# Patient Record
Sex: MALE | Race: WHITE | NOT HISPANIC OR LATINO | Employment: FULL TIME | URBAN - METROPOLITAN AREA
[De-identification: names, ages, dates, MRNs, and addresses within clinical notes are randomized per-mention and may not be internally consistent; named-entity substitution may affect disease eponyms.]

---

## 2017-02-07 ENCOUNTER — OFFICE VISIT (OUTPATIENT)
Dept: FAMILY MEDICINE | Facility: CLINIC | Age: 47
End: 2017-02-07
Payer: COMMERCIAL

## 2017-02-07 VITALS
BODY MASS INDEX: 28.77 KG/M2 | HEART RATE: 76 BPM | TEMPERATURE: 97 F | SYSTOLIC BLOOD PRESSURE: 133 MMHG | OXYGEN SATURATION: 96 % | DIASTOLIC BLOOD PRESSURE: 80 MMHG | WEIGHT: 218 LBS

## 2017-02-07 DIAGNOSIS — B35.3 TINEA PEDIS OF BOTH FEET: ICD-10-CM

## 2017-02-07 DIAGNOSIS — R55 VASOVAGAL SYNCOPE: Primary | ICD-10-CM

## 2017-02-07 PROCEDURE — 99214 OFFICE O/P EST MOD 30 MIN: CPT | Performed by: FAMILY MEDICINE

## 2017-02-07 NOTE — NURSING NOTE
"Chief Complaint   Patient presents with     Syncope     2 fainting spells, one in december and the second was this morning. Cold sweats, dizziness and vision changes before he fainted.     Derm Problem     rash on feet ongoing for a while. Has tried antifungal cream        Initial /80 mmHg  Pulse 76  Temp(Src) 97  F (36.1  C) (Tympanic)  Wt 218 lb (98.884 kg)  SpO2 96% Estimated body mass index is 28.77 kg/(m^2) as calculated from the following:    Height as of 8/1/12: 6' 1\" (1.854 m).    Weight as of this encounter: 218 lb (98.884 kg).  Medication Reconciliation: complete   Liyah Rodriguez CMA    "

## 2017-02-07 NOTE — PROGRESS NOTES
SUBJECTIVE:                                                    Chris Jones is a 47 year old male who presents to clinic today for the following health issues:    Chief Complaint   Patient presents with     Syncope     2 fainting spells, one in december and the second was this morning. Cold sweats, dizziness and vision changes before he fainted.     Derm Problem     rash on feet ongoing for a while. Has tried antifungal cream      Chris Jones is a 47 year old male in generally good health who reports two episodes of syncope or near syncope.  He is accompanied by his wife.  The first episode occurred in December in the evening after basketball practice (he is a ). He was just talking to his wife, then felt ill, she states he looked pale, and he stopped talking and leaned against the wall until the feeling passed within a minute. She brought him some water to drink, and states she does not think he drinks adequately. The incident happened around 7 PM and he had not eaten since 11 AM.  The second episode occurred just this morning. He was sitting at his desk in the room, no students present, when he broke into a cold sweat, felt lightheaded, noted his vision receding, got up and walked to another chair to sit down but then felt out of the chair onto the floor. He was alone, does not think he lost consciousness for more than a minute, got up uninjured.    He had not had any breakfast this morning, normally skips breakfast, packs dinner leftovers to take to work for lunch around 11, may not eat again until 5-8 PM or sometimes later, denies afternoon snacks.  He will have 1-2 cups of coffee over the course of the morning and did have coffee today.  He agrees he doesn't drink much fluid, but does go to the bathroom 2-3 times daily.    He is a nonsmoker, denies chest pain or palpitation, denies breathing issues, has no problems keeping up in the basketball practices.  He has had mild head cold symptoms but no  fever or sore throat, denies GI or  issues.    OBJECTIVE: /80 mmHg  Pulse 76  Temp(Src) 97  F (36.1  C) (Tympanic)  Wt 218 lb (98.884 kg)  SpO2 96%    This is a well nourished, well developed gentleman, neatly and professionally dressed, in no apparent distress.  PERRL, conjunctiva clear.  Ears are clear; TMs show no fluid or erythema.  Nose is unremarkable.  Throat is clear.  Neck is without adenopathy.  The lungs are clear without wheezes, rales, or rhonchi.  Heart sounds are regular without murmur.  The abdomen is benign.  Skin is remarkable for a dry scaly rash on the instep and great toe, consistent with tinea pedis, currently being treated with terbinafine.     ASSESSMENT: vasovagal syncope       Tinea pedis      PLAN: We discussed adding a small amount of solid food to his breakfast, keeping snacks on hand for mid-afternoon, particularly if he is scheduled for after-school practice and will not be eating supper until late.   Increase fluid intake.  If feeling presyncopal, sit or lie down immediately and have someone bring a beverage.  At this time, no further work-up appears indicated.   Foot fungus discussed. His socks do get sweaty; recommend socks change during day.    Abena Esquivel md

## 2017-02-07 NOTE — MR AVS SNAPSHOT
After Visit Summary   2/7/2017    Chris Jones    MRN: [id number]           Patient Information     Date Of Birth          1970        Visit Information        Provider Department      2/7/2017 9:40 AM Abena Esquivel MD [facility]        Today's Diagnoses     Vasovagal syncope    -  1     Tinea pedis of both feet            Follow-ups after your visit        Who to contact     If you have questions or need follow up information about today's clinic visit or your schedule please contact [facility] directly at [phone].  Normal or non-critical lab and imaging results will be communicated to you by Oktalogichart, letter or phone within 4 business days after the clinic has received the results. If you do not hear from us within 7 days, please contact the clinic through Oktalogichart or phone. If you have a critical or abnormal lab result, we will notify you by phone as soon as possible.  Submit refill requests through [employer]PUBL[employer] or call your pharmacy and they will forward the refill request to us. Please allow 3 business days for your refill to be completed.          Additional Information About Your Visit        MyChart Information     [employer] gives you secure access to your electronic health record. If you see a primary care provider, you can also send messages to your care team and make appointments. If you have questions, please call your primary care clinic.  If you do not have a primary care provider, please call [phone] and they will assist you.        Care EveryWhere ID     This is your Care EveryWhere ID. This could be used by other organizations to access your [place] medical records  [id number]        Your Vitals Were     Pulse Temperature Pulse Oximetry             76 97  F (36.1  C) (Tympanic) 96%          Blood Pressure from Last 3 Encounters:   02/07/17 133/80   12/23/15 136/82   08/14/12 131/85    Weight from Last 3 Encounters:   02/07/17 218  lb (98.884 kg)   12/23/15 214 lb (97.07 kg)   08/14/12 218 lb 8 oz (99.111 kg)              Today, you had the following     No orders found for display       Primary Care Provider Office Phone # Fax #    R Kwabena Galvan [phone]       [facility] [id number] [place] [id number]        Thank you!     Thank you for choosing [facility]  for your care. Our goal is always to provide you with excellent care. Hearing back from our patients is one way we can continue to improve our services. Please take a few minutes to complete the written survey that you may receive in the mail after your visit with us. Thank you!             Your Updated Medication List - Protect others around you: Learn how to safely use, store and throw away your medicines at www.disposemymeds.org.          This list is accurate as of: 2/7/17 10:53 AM.  Always use your most recent med list.                   Brand Name Dispense Instructions for use    NO ACTIVE MEDICATIONS      .       terbinafine 1 % cream    lamISIL AT    12 g    Apply topically 2 times daily For fungal infection not resolved with other antifungals (e.g. Clotrimazole)

## 2017-12-05 ENCOUNTER — OFFICE VISIT (OUTPATIENT)
Dept: FAMILY MEDICINE | Facility: CLINIC | Age: 47
End: 2017-12-05
Payer: COMMERCIAL

## 2017-12-05 VITALS
SYSTOLIC BLOOD PRESSURE: 106 MMHG | BODY MASS INDEX: 28.63 KG/M2 | HEART RATE: 72 BPM | HEIGHT: 73 IN | WEIGHT: 216 LBS | DIASTOLIC BLOOD PRESSURE: 72 MMHG

## 2017-12-05 DIAGNOSIS — R55 VASOVAGAL SYNCOPE: Primary | ICD-10-CM

## 2017-12-05 DIAGNOSIS — G47.30 SLEEP APNEA, UNSPECIFIED TYPE: ICD-10-CM

## 2017-12-05 LAB
ALBUMIN SERPL-MCNC: 4 G/DL (ref 3.4–5)
ALP SERPL-CCNC: 54 U/L (ref 40–150)
ALT SERPL W P-5'-P-CCNC: 32 U/L (ref 0–70)
ANION GAP SERPL CALCULATED.3IONS-SCNC: 7 MMOL/L (ref 3–14)
AST SERPL W P-5'-P-CCNC: 13 U/L (ref 0–45)
BASOPHILS # BLD AUTO: 0 10E9/L (ref 0–0.2)
BASOPHILS NFR BLD AUTO: 0.4 %
BILIRUB SERPL-MCNC: 0.8 MG/DL (ref 0.2–1.3)
BUN SERPL-MCNC: 15 MG/DL (ref 7–30)
CALCIUM SERPL-MCNC: 8.4 MG/DL (ref 8.5–10.1)
CHLORIDE SERPL-SCNC: 106 MMOL/L (ref 94–109)
CO2 SERPL-SCNC: 28 MMOL/L (ref 20–32)
CREAT SERPL-MCNC: 0.95 MG/DL (ref 0.66–1.25)
DIFFERENTIAL METHOD BLD: NORMAL
EOSINOPHIL # BLD AUTO: 0.3 10E9/L (ref 0–0.7)
EOSINOPHIL NFR BLD AUTO: 4.5 %
ERYTHROCYTE [DISTWIDTH] IN BLOOD BY AUTOMATED COUNT: 11.8 % (ref 10–15)
GFR SERPL CREATININE-BSD FRML MDRD: 85 ML/MIN/1.7M2
GLUCOSE SERPL-MCNC: 89 MG/DL (ref 70–99)
HCT VFR BLD AUTO: 44.8 % (ref 40–53)
HGB BLD-MCNC: 15.5 G/DL (ref 13.3–17.7)
LYMPHOCYTES # BLD AUTO: 2.1 10E9/L (ref 0.8–5.3)
LYMPHOCYTES NFR BLD AUTO: 28.9 %
MCH RBC QN AUTO: 30.5 PG (ref 26.5–33)
MCHC RBC AUTO-ENTMCNC: 34.6 G/DL (ref 31.5–36.5)
MCV RBC AUTO: 88 FL (ref 78–100)
MONOCYTES # BLD AUTO: 0.5 10E9/L (ref 0–1.3)
MONOCYTES NFR BLD AUTO: 7.4 %
NEUTROPHILS # BLD AUTO: 4.2 10E9/L (ref 1.6–8.3)
NEUTROPHILS NFR BLD AUTO: 58.8 %
PLATELET # BLD AUTO: 257 10E9/L (ref 150–450)
POTASSIUM SERPL-SCNC: 3.9 MMOL/L (ref 3.4–5.3)
PROT SERPL-MCNC: 7.6 G/DL (ref 6.8–8.8)
RBC # BLD AUTO: 5.09 10E12/L (ref 4.4–5.9)
SODIUM SERPL-SCNC: 141 MMOL/L (ref 133–144)
TSH SERPL DL<=0.005 MIU/L-ACNC: 0.67 MU/L (ref 0.4–4)
WBC # BLD AUTO: 7.2 10E9/L (ref 4–11)

## 2017-12-05 PROCEDURE — 36415 COLL VENOUS BLD VENIPUNCTURE: CPT | Performed by: FAMILY MEDICINE

## 2017-12-05 PROCEDURE — 99214 OFFICE O/P EST MOD 30 MIN: CPT | Performed by: FAMILY MEDICINE

## 2017-12-05 PROCEDURE — 80050 GENERAL HEALTH PANEL: CPT | Performed by: FAMILY MEDICINE

## 2017-12-05 NOTE — NURSING NOTE
"Chief Complaint   Patient presents with     Patient Request     pt. is here today with concerns with another episode of blacking out 12/1/2017. pt. was seen in clinic on 2/7/2017 and saw Dr. Esquivel for same. pt. is requesting lab work. pt. is not fasting today.        Initial /72 (BP Location: Right arm, Patient Position: Chair, Cuff Size: Adult Large)  Pulse 72  Ht 6' 1\" (1.854 m)  Wt 216 lb (98 kg)  BMI 28.5 kg/m2 Estimated body mass index is 28.5 kg/(m^2) as calculated from the following:    Height as of this encounter: 6' 1\" (1.854 m).    Weight as of this encounter: 216 lb (98 kg).  Medication Reconciliation: complete     Estrella Person, CMA      "

## 2017-12-05 NOTE — PROGRESS NOTES
Subjective:  Chris Jones is a 47 year old male   Chief Complaint   Patient presents with     Patient Request     pt. is here today with concerns with another episode of blacking out 12/1/2017. pt. was seen in clinic on 2/7/2017 and saw Dr. Esquivel for same. pt. is requesting lab work. pt. is not fasting today.      He has now had 3 episodes of syncope in the last year.  The first 2 occurred when he had not eaten any food for a number of hours, so he was advised to eat a moderate breakfast every morning and to be sure to maintain adequate hydration.  However the last episode occurred and he had been eating and taking adequate fluids.  None of the episodes were associated with being frightened or seen an unpleasant site, or standing at attention.  He will get some morning and that he feels sweaty and weak and nauseated but has never had palpitations or feeling of tachycardia.  They do not seem to be associated with stress.      He has a strong family history of sleep apnea and his wife has observed apneic episodes while sleeping.  He is given a referral for a sleep evaluation a couple years ago but never got around to arranging it.  Is willing to go ahead at this time      Encounter Diagnoses   Name Primary?     Vasovagal syncope Yes     Sleep apnea, unspecified type        ROS:other than noted above, general, HEENT, respiratory, cardiac, gastrointestinal systems are negative    Medical, surgical, social, and family histories, medications and allergies reviewed and updated.    Objective:  Exam:    GENERAL APPEARANCE ADULT: Alert, no acute distress  EYES: PERRL, EOM normal, conjunctiva and lids normal  HENT: Ears and TMs normal, oral mucosa and posterior oropharynx normal  NECK: No adenopathy,masses or thyromegaly  RESP: lungs clear to auscultation   CV: normal rate, regular rhythm, no murmur or gallop  NEURO: Alert, oriented, speech and mentation normal  PSYCH: mentation appears normal., affect and mood  normal      ASSESSMENT:  1. Vasovagal syncope    2. Sleep apnea, unspecified type      These are probably vasovagal syncopal episodes, but he does not have any of the usual triggers associated with this type of syncope.  Therefore some additional workup is warranted    PLAN:      At this point he has not had any laboratory evaluation so will obtain that today.  We will also set him up for a Holter monitor to check for any irregular rhythms or signs of bradycardia  Orders Placed This Encounter     CBC with platelets differential     Comprehensive metabolic panel     TSH with free T4 reflex     SLEEP EVALUATION & MANAGEMENT REFERRAL - [facility]  [phone] (Age 2 and up)       I will contact him when all the results are back

## 2017-12-05 NOTE — MR AVS SNAPSHOT
After Visit Summary   12/5/2017    Chris Jones    MRN: [id number]           Patient Information     Date Of Birth          1970        Visit Information        Provider Department      12/5/2017 11:20 AM ESTHER Galvan MD [facility]        Today's Diagnoses     Vasovagal syncope    -  1    Sleep apnea, unspecified type           Follow-ups after your visit        Additional Services     SLEEP EVALUATION & MANAGEMENT REFERRAL - [facility]  [phone] (Age 2 and up)       Please be aware that coverage of these services is subject to the terms and limitations of your health insurance plan.  Call member services at your health plan with any benefit or coverage questions.      Please bring the following to your appointment:    >>   List of current medications   >>   This referral request   >>   Any documents/labs given to you for this referral                      Future tests that were ordered for you today     Open Future Orders        Priority Expected Expires Ordered    SLEEP EVALUATION & MANAGEMENT REFERRAL - [facility]  [phone] (Age 2 and up) Routine  12/5/2018 12/5/2017    Zio Patch Holter Routine  1/19/2018 12/5/2017            Who to contact     If you have questions or need follow up information about today's clinic visit or your schedule please contact [facility] directly at [phone].  Normal or non-critical lab and imaging results will be communicated to you by MyChart, letter or phone within 4 business days after the clinic has received the results. If you do not hear from us within 7 days, please contact the clinic through MyChart or phone. If you have a critical or abnormal lab result, we will notify you by phone as soon as possible.  Submit refill requests through [employer]Worx or call your pharmacy and they will forward the refill request to us. Please allow 3 business days for your  "refill to be completed.          Additional Information About Your Visit        MyChart Information     Workday gives you secure access to your electronic health record. If you see a primary care provider, you can also send messages to your care team and make appointments. If you have questions, please call your primary care clinic.  If you do not have a primary care provider, please call [phone] and they will assist you.        Care EveryWhere ID     This is your Care EveryWhere ID. This could be used by other organizations to access your [place] medical records  [id number]        Your Vitals Were     Pulse Height BMI (Body Mass Index)             72 6' 1\" (1.854 m) 28.5 kg/m2          Blood Pressure from Last 3 Encounters:   12/05/17 106/72   02/07/17 133/80   12/23/15 136/82    Weight from Last 3 Encounters:   12/05/17 216 lb (98 kg)   02/07/17 218 lb (98.9 kg)   12/23/15 214 lb (97.1 kg)              We Performed the Following     CBC with platelets differential     Comprehensive metabolic panel     TSH with free T4 reflex        Primary Care Provider Office Phone # Fax #    R Kwabena Galvan [phone] [address] [facility] [address]        Equal Access to Services     HERMES FUNEZ : Hadii ismael thompson hadasho Soomaali, waaxda luqadaha, qaybta kaalmada adeegyada, tanika hoffmann terrence roe. So [facility] [phone].    ATENCIÓN: Si habla español, tiene a gonzales disposición servicios gratuitos de asistencia lingüística. Llame al [phone].    We comply with applicable federal civil rights laws and [place] laws. We do not discriminate on the basis of race, color, national origin, age, disability, sex, sexual orientation, or gender identity.            Thank you!     Thank you for choosing [facility]  for your care. Our goal is always to provide you with excellent care. Hearing back from our patients is one way we can continue to improve our services. " Please take a few minutes to complete the written survey that you may receive in the mail after your visit with us. Thank you!             Your Updated Medication List - Protect others around you: Learn how to safely use, store and throw away your medicines at www.disposemymeds.org.          This list is accurate as of: 12/5/17 12:07 PM.  Always use your most recent med list.                   Brand Name Dispense Instructions for use Diagnosis    NO ACTIVE MEDICATIONS      .        terbinafine 1 % cream    lamISIL AT    12 g    Apply topically 2 times daily For fungal infection not resolved with other antifungals (e.g. Clotrimazole)    Tinea pedis of both feet

## 2017-12-07 NOTE — PROGRESS NOTES
Chris,  Your lab studies are all in the acceptable range. We will let you know about the heart monitor results when they become available.      Kwabena Galvan MD

## 2017-12-11 ENCOUNTER — HOSPITAL ENCOUNTER (OUTPATIENT)
Dept: CARDIOLOGY | Facility: CLINIC | Age: 47
Discharge: HOME | End: 2017-12-11
Attending: FAMILY MEDICINE | Admitting: FAMILY MEDICINE
Payer: COMMERCIAL

## 2017-12-11 DIAGNOSIS — R55 VASOVAGAL SYNCOPE: ICD-10-CM

## 2017-12-11 PROCEDURE — 0296T ZIO PATCH HOLTER: CPT

## 2017-12-11 PROCEDURE — 0298T ZZC EXT ECG > 48HR TO 21 DAY REVIEW AND INTERPRETATN: CPT | Performed by: INTERNAL MEDICINE

## 2017-12-19 ENCOUNTER — OFFICE VISIT (OUTPATIENT)
Dept: SLEEP MEDICINE | Facility: CLINIC | Age: 47
End: 2017-12-19
Payer: COMMERCIAL

## 2017-12-19 VITALS
SYSTOLIC BLOOD PRESSURE: 125 MMHG | HEIGHT: 73 IN | DIASTOLIC BLOOD PRESSURE: 79 MMHG | OXYGEN SATURATION: 97 % | HEART RATE: 71 BPM | BODY MASS INDEX: 28.63 KG/M2 | WEIGHT: 216 LBS

## 2017-12-19 DIAGNOSIS — G47.30 SLEEP APNEA, UNSPECIFIED TYPE: ICD-10-CM

## 2017-12-19 DIAGNOSIS — R53.81 MALAISE AND FATIGUE: ICD-10-CM

## 2017-12-19 DIAGNOSIS — R53.83 MALAISE AND FATIGUE: ICD-10-CM

## 2017-12-19 DIAGNOSIS — R06.89 DYSPNEA AND RESPIRATORY ABNORMALITY: Primary | ICD-10-CM

## 2017-12-19 DIAGNOSIS — R06.00 DYSPNEA AND RESPIRATORY ABNORMALITY: Primary | ICD-10-CM

## 2017-12-19 DIAGNOSIS — Z72.820 LACK OF ADEQUATE SLEEP: ICD-10-CM

## 2017-12-19 PROCEDURE — 99244 OFF/OP CNSLTJ NEW/EST MOD 40: CPT | Performed by: PHYSICIAN ASSISTANT

## 2017-12-19 NOTE — PATIENT INSTRUCTIONS

## 2017-12-19 NOTE — MR AVS SNAPSHOT
After Visit Summary   12/19/2017    Chris Jones    MRN: [id number]           Patient Information     Date Of Birth          1970        Visit Information        Provider Department      12/19/2017 1:30 PM Eitan Anaya PA [facility]        Today's Diagnoses     Dyspnea and respiratory abnormality    -  1    Sleep apnea, unspecified type        Lack of adequate sleep        Malaise and fatigue          Care Instructions      Your BMI is Body mass index is 28.5 kg/(m^2).  Weight management is a personal decision.  If you are interested in exploring weight loss strategies, the following discussion covers the approaches that may be successful. Body mass index (BMI) is one way to tell whether you are at a healthy weight, overweight, or obese. It measures your weight in relation to your height.  A BMI of 18.5 to 24.9 is in the healthy range. A person with a BMI of 25 to 29.9 is considered overweight, and someone with a BMI of 30 or greater is considered obese. More than two-thirds of American adults are considered overweight or obese.  Being overweight or obese increases the risk for further weight gain. Excess weight may lead to heart disease and diabetes.  Creating and following plans for healthy eating and physical activity may help you improve your health.  Weight control is part of healthy lifestyle and includes exercise, emotional health, and healthy eating habits. Careful eating habits lifelong are the mainstay of weight control. Though there are significant health benefits from weight loss, long-term weight loss with diet alone may be very difficult to achieve- studies show long-term success with dietary management in less than 10% of people. Attaining a healthy weight may be especially difficult to achieve in those with severe obesity. In some cases, medications, devices and surgical management might be considered.  What can you do?  If you are overweight or obese and  are interested in methods for weight loss, you should discuss this with your provider.     Consider reducing daily calorie intake by 500 calories.     Keep a food journal.     Avoiding skipping meals, consider cutting portions instead.    Diet combined with exercise helps maintain muscle while optimizing fat loss. Strength training is particularly important for building and maintaining muscle mass. Exercise helps reduce stress, increase energy, and improves fitness. Increasing exercise without diet control, however, may not burn enough calories to loose weight.       Start walking three days a week 10-20 minutes at a time    Work towards walking thirty minutes five days a week     Eventually, increase the speed of your walking for 1-2 minutes at time    In addition, we recommend that you review healthy lifestyles and methods for weight loss available through the National Institutes of Health patient information sites:  http://win.niddk.nih.gov/publications/index.htm    And look into health and wellness programs that may be available through your health insurance provider, employer, local community center, or lance club.    Weight management plan: Patient was referred to their PCP to discuss a diet and exercise plan.            Follow-ups after your visit        Your next 10 appointments already scheduled     Dec 26, 2017 10:30 AM CST   HST  with SLEEP LAB, BED FIVE   [facility]    [address]  [facility] [address]   [phone]            Dec 27, 2017  1:00 PM CST   HST Drop Off with SLEEP LAB, BED FIVE   [facility])    [address]  [facility] [address]   [phone]            Jan 02, 2018  2:30 PM CST   Return Sleep Patient with ANUJA Dumont   [facility])    [address]  [facility] [address]   [phone]  "             Future tests that were ordered for you today     Open Future Orders        Priority Expected Expires Ordered    HST-Home Sleep Apnea Test Routine  6/20/2018 12/19/2017            Who to contact     If you have questions or need follow up information about today's clinic visit or your schedule please contact [facility] directly at [phone].  Normal or non-critical lab and imaging results will be communicated to you by MyChart, letter or phone within 4 business days after the clinic has received the results. If you do not hear from us within 7 days, please contact the clinic through PTC Therapeuticshart or phone. If you have a critical or abnormal lab result, we will notify you by phone as soon as possible.  Submit refill requests through [employer] or call your pharmacy and they will forward the refill request to us. Please allow 3 business days for your refill to be completed.          Additional Information About Your Visit        PTC Therapeuticshart Information     [employer] gives you secure access to your electronic health record. If you see a primary care provider, you can also send messages to your care team and make appointments. If you have questions, please call your primary care clinic.  If you do not have a primary care provider, please call [phone] and they will assist you.        Care EveryWhere ID     This is your Care EveryWhere ID. This could be used by other organizations to access your [place] medical records  [id number]        Your Vitals Were     Pulse Height Pulse Oximetry BMI (Body Mass Index)          71 1.854 m (6' 1\") 97% 28.5 kg/m2         Blood Pressure from Last 3 Encounters:   12/19/17 125/79   12/05/17 106/72   02/07/17 133/80    Weight from Last 3 Encounters:   12/19/17 98 kg (216 lb)   12/05/17 98 kg (216 lb)   02/07/17 98.9 kg (218 lb)              We Performed the Following     SLEEP EVALUATION & MANAGEMENT REFERRAL - ADULT -[facility]  " [phone] (Age 2 and up)        Primary Care Provider Office Phone # Fax #    R Kwabena Galvan [phone] [address] [facility] [address]        Equal Access to Services     HERMES FUNEZ : Hadii ismael ku hadngozio Soomaali, waaxda luqadaha, qaybta kaalmada adeegyada, tanika hoffmann terrence powers laAntoniobettie roe. So [facility] [phone].    ATENCIÓN: Si habla español, tiene a gonzales disposición servicios gratuitos de asistencia lingüística. Llame al [phone].    We comply with applicable federal civil rights laws and [place] laws. We do not discriminate on the basis of race, color, national origin, age, disability, sex, sexual orientation, or gender identity.            Thank you!     Thank you for choosing [facility]  for your care. Our goal is always to provide you with excellent care. Hearing back from our patients is one way we can continue to improve our services. Please take a few minutes to complete the written survey that you may receive in the mail after your visit with us. Thank you!             Your Updated Medication List - Protect others around you: Learn how to safely use, store and throw away your medicines at www.disposemymeds.org.          This list is accurate as of: 12/19/17  2:18 PM.  Always use your most recent med list.                   Brand Name Dispense Instructions for use Diagnosis    NO ACTIVE MEDICATIONS      .

## 2017-12-19 NOTE — NURSING NOTE
"Chief Complaint   Patient presents with     Consult     Dr. Mandy barboza, witnessed apnea, daytime fatigue       Initial /79  Pulse 71  Ht 1.854 m (6' 1\")  Wt 98 kg (216 lb)  SpO2 97%  BMI 28.5 kg/m2 Estimated body mass index is 28.5 kg/(m^2) as calculated from the following:    Height as of this encounter: 1.854 m (6' 1\").    Weight as of this encounter: 98 kg (216 lb).  Medication Reconciliation: complete    "

## 2017-12-19 NOTE — PROGRESS NOTES
"  Sleep Consultation:    Date on this visit: 12/19/2017    Chris Jones is sent by ESTHER Galvan for a sleep consultation.    Primary Physician: ESTHER Galvan     CC: \"To address potential sleep apnea and tiredness\"    HPI: Chris Jones is a 47 year old male with history of vasovagal syncope who presents in clinic today for evaluation of possible obstructive sleep apnea.     Chris goes to sleep at 10:00 PM during the week. He wakes up at 6:00 AM with an alarm. He falls asleep in 5 minutes.  Chris denies difficulty falling asleep.  He wakes up 0-1 times a night for 5 minutes before falling back to sleep.  Chris wakes up to go to the bathroom and snoring.  On weekends, Chris goes to sleep at 10:00 PM.  He wakes up at 8:00 AM with an alarm. He falls asleep in 5 minutes.  Patient gets 7-8 hours of sleep per night. He does not feel refreshed.     Patient does use electronics in bed and read in bed and does not watch TV in bed.     Chris does not do shift work.    Chris does snore every night and snoring is very loud. Patient does not have a regular bed partner due to his loud snoring. There is report of snoring.  He does have witnessed apneas. They frequently sleep separately.  Patient sleeps on his back, side and stomach. He has frequent morning dry mouth, denies morning headaches, morning confusion and restless legs. Chris denies any bruxism, sleep walking, sleep talking, dream enactment, sleep paralysis, cataplexy and hypnogogic/hypnopompic hallucinations.    Chris has difficulty breathing through his nose, denies claustrophobia and reflux at night.      Chris has gained 0-5 pounds in the last year.  Patient describes himself as a morning person.  He would prefer to go to sleep at 10:00 PM and wake up at 8:00 AM.  Patient's [place] Sleepiness score 12/24 consistent with some daytime sleepiness.      Chris naps 1-2 times per week for 30 minutes, feels refreshed after naps. He takes some inadvertant naps.  He denies " closing eyes, dozing and falling asleep while driving. Patient was counseled on the importance of driving while alert, to pull over if drowsy, or nap before getting into the vehicle if sleepy.  He uses 2-3 cups/day of coffee. Last caffeine intake is usually before noon.    Allergies:    Allergies   Allergen Reactions     Nkda [No Known Drug Allergies]        Medications:    Current Outpatient Prescriptions   Medication Sig Dispense Refill     NO ACTIVE MEDICATIONS .         Problem List:  Patient Active Problem List    Diagnosis Date Noted     CARDIOVASCULAR SCREENING; LDL GOAL LESS THAN 160 10/31/2010     Priority: Medium        Past Medical/Surgical History:  No past medical history on file.  Past Surgical History:   Procedure Laterality Date     SEPTOPLASTY  1992     VASECTOMY         Social History:  Social History     Social History     Marital status:      Spouse name: N/A     Number of children: N/A     Years of education: N/A     Occupational History     Teacher      Social History Main Topics     Smoking status: Never Smoker     Smokeless tobacco: Never Used     Alcohol use No     Drug use: No     Sexual activity: Yes     Partners: Female     Other Topics Concern     Parent/Sibling W/ Cabg, Mi Or Angioplasty Before 65f 55m? No     Social History Narrative       Family History:  Family History   Problem Relation Age of Onset     Breast Cancer Mother      CANCER Paternal Grandfather      Sleep Apnea Sister        Review of Systems:  A complete review of systems reviewed by me is negative with the exeption of what has been mentioned in the history of present illness.  CONSTITUTIONAL:  NEGATIVE for  drug allergies   EYES: NEGATIVE for changes in vision, blind spots, double vision.  ENT: NEGATIVE for ear pain, sore throat, sinus pain, post-nasal drip, runny nose, bloody nose  CARDIAC: NEGATIVE for fast heartbeats or fluttering in chest, chest pain or pressure, breathlessness when lying flat, swollen legs  "or swollen feet.  NEUROLOGIC: NEGATIVE headaches, weakness or numbness in the arms or legs.  DERMATOLOGIC: NEGATIVE for rashes, new moles or change in mole(s)  PULMONARY: NEGATIVE SOB at rest, SOB with activity, dry cough, productive cough, coughing up blood, wheezing or whistling when breathing.    GASTROINTESTINAL: NEGATIVE for nausea or vomitting, loose or watery stools, fat or grease in stools, constipation, abdominal pain, bowel movements black in color or blood noted.  GENITOURINARY: NEGATIVE for pain during urination, blood in urine, urinating more frequently than usual, irregular menstrual periods.  MUSCULOSKELETAL: NEGATIVE for muscle pain, bone or joint pain, swollen joints.  ENDOCRINE: NEGATIVE for increased thirst or urination, diabetes.  LYMPHATIC: NEGATIVE for swollen lymph nodes, lumps or bumps in the breasts or nipple discharge.    Physical Examination:  Vitals: /79  Pulse 71  Ht 1.854 m (6' 1\")  Wt 98 kg (216 lb)  SpO2 97%  BMI 28.5 kg/m2  BMI= Body mass index is 28.5 kg/(m^2).    Neck Cir (cm): 41 cm    [place] Total Score 12/19/2017   Total score - [place] 12       GENERAL APPEARANCE: alert and no distress  EYES: Eyes grossly normal to inspection, PERRL and conjunctivae and sclerae normal  HENT: ear canals and TM's normal, nose and mouth without ulcers or lesions, oropharynx crowded and tongue base enlarged  NECK: no adenopathy and no asymmetry, masses, or scars  RESP: lungs clear to auscultation - no rales, rhonchi or wheezes  CV: regular rates and rhythm and normal S1 S2, no S3 or S4  MS: extremities normal- no gross deformities noted  NEURO: Normal strength and tone, mentation intact and speech normal  PSYCH: mentation appears normal and affect normal/bright  Mallampati Class: IV.  Tonsillar Stage:   Last Basic Metabolic Panel:  Lab Results   Component Value Date     12/05/2017      Lab Results   Component Value Date    POTASSIUM 3.9 12/05/2017     Lab Results   Component Value " Date    CHLORIDE 106 12/05/2017     Lab Results   Component Value Date    JUANA 8.4 12/05/2017     Lab Results   Component Value Date    CO2 28 12/05/2017     Lab Results   Component Value Date    BUN 15 12/05/2017     Lab Results   Component Value Date    CR 0.95 12/05/2017     Lab Results   Component Value Date    GLC 89 12/05/2017     TSH   Date Value Ref Range Status   12/05/2017 0.67 0.40 - 4.00 mU/L Final   ]    Impression/Plan:  Chris Jones is a 47 year old male with history of vasovagal syncope who presents in clinic today for evaluation of possible obstructive sleep apnea.   Patient has features and risk factors for possible obstructive sleep apnea including: loud snoring, witnessed apnea, non-refreshing sleep, daytime sleepiness(ESS 12) and crowded oropharynx. The STOP-BANG score is 4/8.     Plan:    1. Schedule a Home Sleep Apnea Testing to evaluate for obstructive sleep apnea.    2. Advised him against drowsy driving.    3. Recommend weight management.     Literature provided regarding sleep apnea.      He will follow up with me in approximately one day after his sleep study has been competed to review the results and discuss plan of care.       Home Sleep Apnea Testing  reviewed.  Obstructive sleep apnea reviewed.  Complications of untreated sleep apnea were reviewed.    Eitan Anaya PA-C    CC: ESTHER Galvan

## 2017-12-26 ENCOUNTER — OFFICE VISIT (OUTPATIENT)
Dept: SLEEP MEDICINE | Facility: CLINIC | Age: 47
End: 2017-12-26
Payer: COMMERCIAL

## 2017-12-26 DIAGNOSIS — R53.81 MALAISE AND FATIGUE: ICD-10-CM

## 2017-12-26 DIAGNOSIS — G47.30 SLEEP APNEA, UNSPECIFIED TYPE: ICD-10-CM

## 2017-12-26 DIAGNOSIS — R06.89 DYSPNEA AND RESPIRATORY ABNORMALITY: ICD-10-CM

## 2017-12-26 DIAGNOSIS — R53.83 MALAISE AND FATIGUE: ICD-10-CM

## 2017-12-26 DIAGNOSIS — R06.00 DYSPNEA AND RESPIRATORY ABNORMALITY: ICD-10-CM

## 2017-12-26 DIAGNOSIS — Z72.820 LACK OF ADEQUATE SLEEP: ICD-10-CM

## 2017-12-26 PROCEDURE — G0399 HOME SLEEP TEST/TYPE 3 PORTA: HCPCS | Performed by: FAMILY MEDICINE

## 2017-12-26 NOTE — PROGRESS NOTES
SUBJECTIVE:  Chief Complaint   Patient presents with     Sleep Study      home sleep apnea test         OBJECTIVE:  home sleep apnea test ordered.    Instructions were reviewed with Chris and were also printed for Chris to take with him.   Chris verbalized understanding and demonstrated use very well.     ASSESSMENT/PLAN:  Chris received home sleep apnea test today December 26, 2017. Pt will use device and will return on 12/27/17         This HST performed using a Noxturnal T3 device which recorded snore sound, movement activity, body position, nasal pressure, oronasal thermal airflow, pulse, oximetry, both chest and abdominal respiratory effort. Patient was scored using 1B 4% rule.

## 2017-12-26 NOTE — MR AVS SNAPSHOT
After Visit Summary   12/26/2017    Chris Jones    MRN: [id number]           Patient Information     Date Of Birth          1970        Visit Information        Provider Department      12/26/2017 10:30 AM SLEEP LAB, BED F[facility]        Today's Diagnoses     Lack of adequate sleep        Sleep apnea, unspecified type        Dyspnea and respiratory abnormality        Malaise and fatigue           Follow-ups after your visit        Your next 10 appointments already scheduled     Dec 27, 2017  1:00 PM CST   HST Drop Off with SLEEP LAB, BED [facility]   [facility] [place]    [address] [facility] [address]   [phone]            Jan 02, 2018  2:30 PM CST   Return Sleep Patient with ANUJA Dumont   [facility] [place]    [address] [facility] [address]   [phone]              Who to contact     If you have questions or need follow up information about today's clinic visit or your schedule please contact [facility] directly at [phone].  Normal or non-critical lab and imaging results will be communicated to you by MyChart, letter or phone within 4 business days after the clinic has received the results. If you do not hear from us within 7 days, please contact the clinic through [employer] or phone. If you have a critical or abnormal lab result, we will notify you by phone as soon as possible.  Submit refill requests through [employer] or call your pharmacy and they will forward the refill request to us. Please allow 3 business days for your refill to be completed.          Additional Information About Your Visit        MyChart Information     [employer] gives you secure access to your electronic health record. If you see a primary care provider, you can also send messages to your care team and make appointments. If you have questions,  please call your primary care clinic.  If you do not have a primary care provider, please call [phone] and they will assist you.        Care EveryWhere ID     This is your Care EveryWhere ID. This could be used by other organizations to access your [place] medical records  [id number]         Blood Pressure from Last 3 Encounters:   12/19/17 125/79   12/05/17 106/72   02/07/17 133/80    Weight from Last 3 Encounters:   12/19/17 98 kg (216 lb)   12/05/17 98 kg (216 lb)   02/07/17 98.9 kg (218 lb)              We Performed the Following     HST-Home Sleep Apnea Test        Primary Care Provider Office Phone # Fax #    R Kwabena Galvan [phone] [address] [facility] [address]        Equal Access to Services     HERMES FUNEZ : Hadii ismael thompson hadasho Soannette, waaxda luqadaha, qaybta kaalmada adeantonietayada, tanika landin . So [facility] [phone].    ATENCIÓN: Si habla español, tiene a gonzales disposición servicios gratuitos de asistencia lingüística. Jacobo al [phone].    We comply with applicable federal civil rights laws and [place] laws. We do not discriminate on the basis of race, color, national origin, age, disability, sex, sexual orientation, or gender identity.            Thank you!     Thank you for choosing [facility]  for your care. Our goal is always to provide you with excellent care. Hearing back from our patients is one way we can continue to improve our services. Please take a few minutes to complete the written survey that you may receive in the mail after your visit with us. Thank you!             Your Updated Medication List - Protect others around you: Learn how to safely use, store and throw away your medicines at www.disposemymeds.org.          This list is accurate as of: 12/26/17 10:51 AM.  Always use your most recent med list.                   Brand Name Dispense Instructions for use Diagnosis    NO ACTIVE  MEDICATIONS      .

## 2017-12-27 ENCOUNTER — DOCUMENTATION ONLY (OUTPATIENT)
Dept: SLEEP MEDICINE | Facility: CLINIC | Age: 47
End: 2017-12-27
Payer: COMMERCIAL

## 2017-12-27 NOTE — PROGRESS NOTES
SUBJECTIVE:  Chief Complaint   Patient presents with     Sleep Study     drop off home sleep apnea test       OBJECTIVE:  home sleep apnea test returned to clinic today December 27, 2017.    ASSESSMENT/PLAN:  Results to be scanned and routed to provider for review

## 2018-01-02 ENCOUNTER — OFFICE VISIT (OUTPATIENT)
Dept: SLEEP MEDICINE | Facility: CLINIC | Age: 48
End: 2018-01-02
Payer: COMMERCIAL

## 2018-01-02 ENCOUNTER — DOCUMENTATION ONLY (OUTPATIENT)
Dept: SLEEP MEDICINE | Facility: CLINIC | Age: 48
End: 2018-01-02

## 2018-01-02 VITALS
SYSTOLIC BLOOD PRESSURE: 118 MMHG | DIASTOLIC BLOOD PRESSURE: 79 MMHG | OXYGEN SATURATION: 98 % | WEIGHT: 216 LBS | BODY MASS INDEX: 28.63 KG/M2 | HEART RATE: 88 BPM | HEIGHT: 73 IN

## 2018-01-02 DIAGNOSIS — G47.33 OSA (OBSTRUCTIVE SLEEP APNEA): Primary | ICD-10-CM

## 2018-01-02 PROCEDURE — 99214 OFFICE O/P EST MOD 30 MIN: CPT | Performed by: PHYSICIAN ASSISTANT

## 2018-01-02 NOTE — NURSING NOTE
"Chief Complaint   Patient presents with     Study Results     HST follow up       Initial /79  Pulse 88  Ht 1.854 m (6' 1\")  Wt 98 kg (216 lb)  SpO2 98%  BMI 28.5 kg/m2 Estimated body mass index is 28.5 kg/(m^2) as calculated from the following:    Height as of this encounter: 1.854 m (6' 1\").    Weight as of this encounter: 98 kg (216 lb).  Medication Reconciliation: complete    "

## 2018-01-02 NOTE — PROGRESS NOTES
"  Chief Complaint   Patient presents with     Study Results     HST follow up       Chris Jones is a 47 year old male who returns to [facility] for review of sleep testing results. He presented with loud snoring, witnessed apnea, non-refreshing sleep, daytime sleepiness(ESS 12) and crowded oropharynx..    Estimated body mass index is 28.5 kg/(m^2) as calculated from the following:    Height as of this encounter: 1.854 m (6' 1\").    Weight as of this encounter: 98 kg (216 lb).  Total score - [place]: 12 (2017  1:37 PM)  STOP-BAN/8    Home Sleep Apnea Testing - 2017: 216 lbs 0 oz: AHI 52.2/hr. Supine AHI 81.5/hr.   Oxygen [place] of 77%.  Baseline 92.6%.  Sp02 =< 88% for 29.4 minutes.  He slept on his back (40.4%), prone (1.8%), left (24.6) and right (32.5%) sides.   Analysis time: 576 minutes.     Signal quality of Oxymeter 99% Good  Nasal Cannula 100% Good  RIP belts 100% Good.       Chris Jones reports that he slept Fair.     Reviewed by team: Allergies  Meds       Reviewed by provider:          Problem List:  Patient Active Problem List    Diagnosis Date Noted     CARDIOVASCULAR SCREENING; LDL GOAL LESS THAN 160 10/31/2010     Priority: Medium        /79  Pulse 88  Ht 1.854 m (6' 1\")  Wt 98 kg (216 lb)  SpO2 98%  BMI 28.5 kg/m2    Impression/Plan:  Severe Obstructive Sleep Apnea.   Sleep associated hypoxemia was present.       Results were reviewed in detail today with Chris and a copy given to him for his records.  Discussed this level of [place] is associated with increase in long-term cardiovascular risk factors  Treatment options discussed today including auto-CPAP at 5-15 cmH2O, oral appliance therapy, positional therapy or polysomnography with full night PAP titration.  Elected treatment with auto-CPAP at 5-15 cmH2O.   Overnight oximetry once the patient is established on CPAP therapy.     He will follow up with me in about 7 week(s).     Twenty-five minutes " spent with patient, all of which were spent face-to-face counseling, consulting, coordinating plan of care regarding SEYMOUR and hypoxemia.      Eitan Anaya PA-C

## 2018-01-02 NOTE — PROCEDURES
"HOME SLEEP STUDY INTERPRETATION    Patient: Chris Jones  MRN: [id number]  YOB: 1970  Study Date: 2017  Referring Provider: ESTHER Galvan  Ordering Provider: ANUJA Henry     Indications for Home Study: Chris Jones is a 47 year old male with a history of vasovagal syncope who presents with symptoms suggestive of obstructive sleep apnea.    Estimated body mass index is 28.5 kg/(m^2) as calculated from the following:    Height as of an earlier encounter on 18: 1.854 m (6' 1\").    Weight as of an earlier encounter on 18: 98 kg (216 lb).  Total score - [place]: 12 (2017  1:37 PM)  STOP-BAN/8    Data: A full night home sleep study was performed recording the standard physiologic parameters including body position, movement, sound, nasal pressure, thermal oral airflow, chest and abdominal movements with respiratory inductance plethysmography, and oxygen saturation by pulse oximetry. Pulse rate was estimated by oximetry recording. This study was considered adequate based on > 4 hours of quality oximetry and respiratory recording. As specified by the AASM Manual for the Scoring of Sleep and Associated events, version 2.3, Rule VIII.D 1B, 4% oxygen desaturation scoring for hypopneas is used as a standard of care on all home sleep apnea testing.    Analysis Time:  576 minutes    Respiration:   Sleep Associated Hypoxemia: sustained hypoxemia was not present. Baseline oxygen saturation was 92.6%.  Time with saturation less than or equal to 88% was 29.4 minutes. The lowest oxygen saturation was 77%.   Snoring: Snoring was present.  Respiratory events: The home study revealed a presence of 230 obstructive apneas and 84 mixed and central apneas. There were 187 hypopneas resulting in a combined apnea/hypopnea index [AHI] of 52.2 events per hour.  AHI was 81.5 per hour supine, 5.6 per hour prone, 39.9 per hour on left side, and 27.7 per hour on right side.   Pattern: Excluding events " noted above, respiratory rate and pattern was Normal.    Position: Percent of time spent: supine - 40.4%, prone - 1.8%, on left - 24.6%, on right - 32.5%.    Heart Rate: By pulse oximetry normal rate was noted.     Assessment:   Severe obstructive sleep apnea.  Sleep associated hypoxemia was present.    Recommendations:  Consider auto-CPAP at 5-15 cmH2O or polysomnography with full night PAP titration.  Suggest optimizing sleep hygiene and avoiding sleep deprivation.  Weight management.    Diagnosis Code(s): Obstructive Sleep Apnea G47.33, Hypoxemia G47.36    Sriram Bennett MD, MD, January 2, 2018   Diplomate, American Board of Family Medicine, Sleep Medicine

## 2018-01-02 NOTE — PATIENT INSTRUCTIONS

## 2018-01-02 NOTE — MR AVS SNAPSHOT
After Visit Summary   1/2/2018    Chris Jones    MRN: [id number]           Patient Information     Date Of Birth          1970        Visit Information        Provider Department      1/2/2018 2:30 PM Eitan Anaya PA [facility] [place] [facility] [place]        Today's Diagnoses     SEYMOUR (obstructive sleep apnea)    -  1      Care Instructions      Your BMI is Body mass index is 28.5 kg/(m^2).  Weight management is a personal decision.  If you are interested in exploring weight loss strategies, the following discussion covers the approaches that may be successful. Body mass index (BMI) is one way to tell whether you are at a healthy weight, overweight, or obese. It measures your weight in relation to your height.  A BMI of 18.5 to 24.9 is in the healthy range. A person with a BMI of 25 to 29.9 is considered overweight, and someone with a BMI of 30 or greater is considered obese. More than two-thirds of American adults are considered overweight or obese.  Being overweight or obese increases the risk for further weight gain. Excess weight may lead to heart disease and diabetes.  Creating and following plans for healthy eating and physical activity may help you improve your health.  Weight control is part of healthy lifestyle and includes exercise, emotional health, and healthy eating habits. Careful eating habits lifelong are the mainstay of weight control. Though there are significant health benefits from weight loss, long-term weight loss with diet alone may be very difficult to achieve- studies show long-term success with dietary management in less than 10% of people. Attaining a healthy weight may be especially difficult to achieve in those with severe obesity. In some cases, medications, devices and surgical management might be considered.  What can you do?  If you are overweight or obese and are interested in methods for weight loss, you should discuss this with your provider.     Consider  reducing daily calorie intake by 500 calories.     Keep a food journal.     Avoiding skipping meals, consider cutting portions instead.    Diet combined with exercise helps maintain muscle while optimizing fat loss. Strength training is particularly important for building and maintaining muscle mass. Exercise helps reduce stress, increase energy, and improves fitness. Increasing exercise without diet control, however, may not burn enough calories to loose weight.       Start walking three days a week 10-20 minutes at a time    Work towards walking thirty minutes five days a week     Eventually, increase the speed of your walking for 1-2 minutes at time    In addition, we recommend that you review healthy lifestyles and methods for weight loss available through the National Institutes of Health patient information sites:  http://win.niddk.nih.gov/publications/index.htm    And look into health and wellness programs that may be available through your health insurance provider, employer, local community center, or lance club.    Weight management plan: Patient was referred to their PCP to discuss a diet and exercise plan.      Your Body mass index is 28.5 kg/(m^2).  Weight management is a personal decision.  If you are interested in exploring weight loss strategies, the following discussion covers the approaches that may be successful. Body mass index (BMI) is one way to tell whether you are at a healthy weight, overweight, or obese. It measures your weight in relation to your height.  A BMI of 18.5 to 24.9 is in the healthy range. A person with a BMI of 25 to 29.9 is considered overweight, and someone with a BMI of 30 or greater is considered obese. More than two-thirds of American adults are considered overweight or obese.  Being overweight or obese increases the risk for further weight gain. Excess weight may lead to heart disease and diabetes.  Creating and following plans for healthy eating and physical activity  may help you improve your health.  Weight control is part of healthy lifestyle and includes exercise, emotional health, and healthy eating habits. Careful eating habits lifelong are the mainstay of weight control. Though there are significant health benefits from weight loss, long-term weight loss with diet alone may be very difficult to achieve- studies show long-term success with dietary management in less than 10% of people. Attaining a healthy weight may be especially difficult to achieve in those with severe obesity. In some cases, medications, devices and surgical management might be considered.  What can you do?  If you are overweight or obese and are interested in methods for weight loss, you should discuss this with your provider.     Consider reducing daily calorie intake by 500 calories.     Keep a food journal.     Avoiding skipping meals, consider cutting portions instead.    Diet combined with exercise helps maintain muscle while optimizing fat loss. Strength training is particularly important for building and maintaining muscle mass. Exercise helps reduce stress, increase energy, and improves fitness. Increasing exercise without diet control, however, may not burn enough calories to loose weight.       Start walking three days a week 10-20 minutes at a time    Work towards walking thirty minutes five days a week     Eventually, increase the speed of your walking for 1-2 minutes at time    In addition, we recommend that you review healthy lifestyles and methods for weight loss available through the National Institutes of Health patient information sites:  http://win.niddk.nih.gov/publications/index.htm    And look into health and wellness programs that may be available through your health insurance provider, employer, local community center, or lance club.    Weight management plan: Patient was referred to their PCP to discuss a diet and exercise plan.            Follow-ups after your visit        Who  "to contact     If you have questions or need follow up information about today's clinic visit or your schedule please contact [facility] directly at [phone].  Normal or non-critical lab and imaging results will be communicated to you by MyChart, letter or phone within 4 business days after the clinic has received the results. If you do not hear from us within 7 days, please contact the clinic through MyChart or phone. If you have a critical or abnormal lab result, we will notify you by phone as soon as possible.  Submit refill requests through [facility] or call your pharmacy and they will forward the refill request to us. Please allow 3 business days for your refill to be completed.          Additional Information About Your Visit        [facility] Information     [facility] gives you secure access to your electronic health record. If you see a primary care provider, you can also send messages to your care team and make appointments. If you have questions, please call your primary care clinic.  If you do not have a primary care provider, please call [phone] and they will assist you.        Care EveryWhere ID     This is your Care EveryWhere ID. This could be used by other organizations to access your [facility] medical records  [id number]        Your Vitals Were     Pulse Height Pulse Oximetry BMI (Body Mass Index)          88 1.854 m (6' 1\") 98% 28.5 kg/m2         Blood Pressure from Last 3 Encounters:   01/02/18 118/79   12/19/17 125/79   12/05/17 106/72    Weight from Last 3 Encounters:   01/02/18 98 kg (216 lb)   12/19/17 98 kg (216 lb)   12/05/17 98 kg (216 lb)              We Performed the Following     Sleep Comprehensive DME        Primary Care Provider Office Phone # Fax #    R Kwabena Galvan [phone] [id number] [facility] [id number]        Equal Access to Services     HERMES FUNEZ : Michelle Aranda, daisha osullivan, qachica don " tanika kamararaphaelchase la'aator ah. Cristina [facility] [phone].    ATENCIÓN: Si habla kishan, tiene a gonzales disposición servicios gratuitos de asistencia lingüística. Jacobo al [phone].    We comply with applicable federal civil rights laws and [place] laws. We do not discriminate on the basis of race, color, national origin, age, disability, sex, sexual orientation, or gender identity.            Thank you!     Thank you for choosing [facility]  for your care. Our goal is always to provide you with excellent care. Hearing back from our patients is one way we can continue to improve our services. Please take a few minutes to complete the written survey that you may receive in the mail after your visit with us. Thank you!             Your Updated Medication List - Protect others around you: Learn how to safely use, store and throw away your medicines at www.disposemymeds.org.          This list is accurate as of: 1/2/18  3:02 PM.  Always use your most recent med list.                   Brand Name Dispense Instructions for use Diagnosis    NO ACTIVE MEDICATIONS      .

## 2018-01-15 ENCOUNTER — DOCUMENTATION ONLY (OUTPATIENT)
Dept: SLEEP MEDICINE | Facility: CLINIC | Age: 48
End: 2018-01-15
Payer: COMMERCIAL

## 2018-01-15 NOTE — PROGRESS NOTES
Patient was offered choice of vendor and chose [facility].  Patient Chris Jones was set up at [facility]  on January 15, 2018. Patient received a Resmed AirSense 10 Auto. Pressures were set at 5-15 cm H2O.   Patient s ramp is 0 cm H2O for Off and FLEX/EPR is 2.  Patient received a Resmed Mask name: [id number]  Full Face mask Size Large, heated tubing and heated humidifier.  Patient is enrolled in the STM Program and does need to meet compliance. Patient has a follow up on 2/28/18 with ANUJA Henry.    Neisha Scott

## 2018-01-18 ENCOUNTER — DOCUMENTATION ONLY (OUTPATIENT)
Dept: SLEEP MEDICINE | Facility: CLINIC | Age: 48
End: 2018-01-18
Payer: COMMERCIAL

## 2018-01-18 NOTE — PROGRESS NOTES
3 DAY STM VISIT    Patient contacted for 3 day STM visit  Message left for patient to return call     Device type: Auto-CPAP  PAP settings: CPAP min 5 cm  H20     CPAP max 15 cm  H20       Assessment: Nightly usage, most nights over four hours   Action plan: Pt to have f/u 14 day STM visit.  Diagnostic AHI:  52.2

## 2018-01-25 ENCOUNTER — DOCUMENTATION ONLY (OUTPATIENT)
Dept: SLEEP MEDICINE | Facility: CLINIC | Age: 48
End: 2018-01-25
Payer: COMMERCIAL

## 2018-01-25 NOTE — PROGRESS NOTES
PATIENT CAME INTO THE [facility] SLEEP LAB TODAY FOR A MASK FITTING. HE HAS BEEN HAVING SOME LEAKING WITH HIS AIRFIT F20 MASK WHEN HE SLEEPS ON HIS SIDE. TODAY HE TRIED ON THE AIRFIT F20 MEDIUM WHICH WAS TOO SMALL, AND THE SIMPLUS LARGE FULL FACE MASK. THE SIMPLUS LEAKED VERY BADLY, BUT HIS AIRFIT F20 LARGE WHICH HE USES SEEMED TO GET A PRETTY GOOD SEAL TODAY. I ALSO LOOKED ON [employer]VIEW AND IT WAS NOT REGISTERING A LARGE LEAK THROUGHOUT THE NIGHT. HE HAS BEEN MAXING OUT HIS PRESSURES MOST OF THE NIGHT SO HE MAY CONTACT [facility] FOR HELP WITH POSSIBLY ADJUSTING HIS PRESSURES TO BE HIGHER. HE IS GOING TO STICK WITH THE AIRFIT F20 LARGE FOR NOW.

## 2018-01-30 ENCOUNTER — DOCUMENTATION ONLY (OUTPATIENT)
Dept: SLEEP MEDICINE | Facility: CLINIC | Age: 48
End: 2018-01-30
Payer: COMMERCIAL

## 2018-01-30 NOTE — PROGRESS NOTES
14 DAY STM VISIT    Waiting for patient to return call.       Assessment: Pt not meeting objective benchmarks for AHI and compliance.  Action plan: Waiting for patient to return call and pt to have 30 day STM visit.    Device type: Auto-CPAP  PAP settings:  CPAP min 5 cm  H20      CPAP max 15 cm  H20     95th% pressure 14.4 cm     Objective measures: 14 day rolling measures      Compliance  78 %      Leak  2.16 lpm  last  upload      AHI 6.89   last  upload      Average number of minutes 312    Diagnostic AHI:  52.2    Objective measure goal  Compliance   Goal >70%  Leak   Goal < 24 lpm  AHI  Goal < 5  Usage  Goal >240

## 2018-02-14 ENCOUNTER — DOCUMENTATION ONLY (OUTPATIENT)
Dept: SLEEP MEDICINE | Facility: CLINIC | Age: 48
End: 2018-02-14

## 2018-02-14 NOTE — PROGRESS NOTES
30 DAY [facility] VISIT    Message left for patient to return call     Assessment: Pt not meeting objective benchmarks for AHI    Action plan: waiting for patient to return call.   Patient has a follow up visit with ANUJA Henry on 2/28/2018.   Device type: Auto-CPAP  PAP settings: CPAP min 5 cm  H20     CPAP max 15 cm  H20    95th% pressure 14.6 cm  H20   Objective measures: 14 day rolling measures      Compliance  71 %      Leak  2.88 lpm  last  upload      AHI 8.55   last  Upload- central and obstructive events.       Average number of minutes 302    Diagnostic AHI:   52.2        Objective measure goal  Compliance   Goal >70%  Leak   Goal < 24 lpm  AHI  Goal < 5  Usage  Goal >240

## 2018-02-28 ENCOUNTER — OFFICE VISIT (OUTPATIENT)
Dept: SLEEP MEDICINE | Facility: CLINIC | Age: 48
End: 2018-02-28
Payer: COMMERCIAL

## 2018-02-28 VITALS
OXYGEN SATURATION: 99 % | HEART RATE: 67 BPM | HEIGHT: 73 IN | WEIGHT: 220 LBS | SYSTOLIC BLOOD PRESSURE: 118 MMHG | BODY MASS INDEX: 29.16 KG/M2 | DIASTOLIC BLOOD PRESSURE: 79 MMHG

## 2018-02-28 DIAGNOSIS — G47.33 OSA (OBSTRUCTIVE SLEEP APNEA): Primary | ICD-10-CM

## 2018-02-28 PROCEDURE — 99214 OFFICE O/P EST MOD 30 MIN: CPT | Performed by: PHYSICIAN ASSISTANT

## 2018-02-28 NOTE — PROGRESS NOTES
Obstructive Sleep Apnea- PAP Follow-Up Visit:    Chief Complaint   Patient presents with     CPAP Follow Up     Compliance follow up       Chris Jones comes in today for follow-up of their severe sleep apnea, managed with CPAP.     Chris Jones initially presented with loud snoring, witnessed apnea, non-refreshing sleep, daytime sleepiness(ESS 12) and crowded oropharynx..      Home Sleep Apnea Testing - 12/26/2017: 216 lbs 0 oz: AHI 52.2/hr. Supine AHI 81.5/hr.   Oxygen [place] of 77%. Baseline 92.6%. Sp02 =< 88% for 29.4 minutes.    January 15, 2018. Patient received a Resmed AirSense 10 Auto. Pressures were set at 5-15 cm H2O.    Overall, the patient rates his experience with PAP as 7 (0 poor, 10 great). The mask is comfortable. The mask is not leaking.  He is not snoring with the mask on. He is not having gasp arousals. He is not having any oral or nasal dryness. The pressure settings feel low.     His PAP interface is Full Face Mask.    Bedtime is typically 10 PM. Usually it takes about 5 minutes to fall asleep with the mask on. Wake time is typically 6 AM.  Patient is using PAP therapy 5-6 hours per night. The patient is usually getting 7 hours of sleep per night.    He does feel rested in the morning.    Total score - [place]: 5 (2/28/2018  3:05 PM)    ResMed   Auto-PAP 5.0 - 15.0 cmH2O 30 day usage data:    70% of days with > 4 hours of use. 0/30 days with no use. Average use 308 minutes per day.   95%ile Leak 2.04 L/min.   CPAP 95% pressure 13.8 cm.   AHI 5.42 events per hour.         Past medical/surgical history, family history, social history, medications and allergies were reviewed.      Problem List:  Patient Active Problem List    Diagnosis Date Noted     SEYMOUR (obstructive sleep apnea) 01/02/2018     Priority: Medium     Home Sleep Apnea Testing - 12/26/2017: 216 lbs 0 oz: AHI 52.2/hr. Supine AHI 81.5/hr. Oxygen [place] of 77%.  Baseline 92.6%.  Sp02 =< 88% for 29.4 minutes.       CARDIOVASCULAR  "SCREENING; LDL GOAL LESS THAN 160 10/31/2010     Priority: Medium        /79  Pulse 67  Ht 1.854 m (6' 1\")  Wt 99.8 kg (220 lb)  SpO2 99%  BMI 29.03 kg/m2    Impression/Plan:    1. Severe Sleep apnea with sleep related hypoxemia-  Tolerating PAP well. Daytime symptoms are improved..   Change pressures from 5-15 cm/H20 to 9-18 cm/H20  Comprehensive DME.   Overnight oximetry to follow up on sleep related hypoxemia.    Chris Jones will follow up in about 1 year(s).     Twenty-five minutes spent with patient, all of which were spent face-to-face counseling, consulting, coordinating plan of care regarding SEYMOUR and hypoxemia.      Eitan Anaya PA-C          "

## 2018-02-28 NOTE — PATIENT INSTRUCTIONS

## 2018-02-28 NOTE — NURSING NOTE
"Chief Complaint   Patient presents with     CPAP Follow Up     Compliance follow up       Initial /79  Pulse 67  Ht 1.854 m (6' 1\")  Wt 99.8 kg (220 lb)  SpO2 99%  BMI 29.03 kg/m2 Estimated body mass index is 29.03 kg/(m^2) as calculated from the following:    Height as of this encounter: 1.854 m (6' 1\").    Weight as of this encounter: 99.8 kg (220 lb).  Medication Reconciliation: complete    "

## 2018-02-28 NOTE — MR AVS SNAPSHOT
After Visit Summary   2/28/2018    Chris Jones    MRN: [id number]           Patient Information     Date Of Birth          1970        Visit Information        Provider Department      2/28/2018 3:00 PM Eitan Anaya PA [facility]        Today's Diagnoses     SEYMOUR (obstructive sleep apnea)    -  1      Care Instructions      Your BMI is Body mass index is 29.03 kg/(m^2).  Weight management is a personal decision.  If you are interested in exploring weight loss strategies, the following discussion covers the approaches that may be successful. Body mass index (BMI) is one way to tell whether you are at a healthy weight, overweight, or obese. It measures your weight in relation to your height.  A BMI of 18.5 to 24.9 is in the healthy range. A person with a BMI of 25 to 29.9 is considered overweight, and someone with a BMI of 30 or greater is considered obese. More than two-thirds of American adults are considered overweight or obese.  Being overweight or obese increases the risk for further weight gain. Excess weight may lead to heart disease and diabetes.  Creating and following plans for healthy eating and physical activity may help you improve your health.  Weight control is part of healthy lifestyle and includes exercise, emotional health, and healthy eating habits. Careful eating habits lifelong are the mainstay of weight control. Though there are significant health benefits from weight loss, long-term weight loss with diet alone may be very difficult to achieve- studies show long-term success with dietary management in less than 10% of people. Attaining a healthy weight may be especially difficult to achieve in those with severe obesity. In some cases, medications, devices and surgical management might be considered.  What can you do?  If you are overweight or obese and are interested in methods for weight loss, you should discuss this with your provider.     Consider  reducing daily calorie intake by 500 calories.     Keep a food journal.     Avoiding skipping meals, consider cutting portions instead.    Diet combined with exercise helps maintain muscle while optimizing fat loss. Strength training is particularly important for building and maintaining muscle mass. Exercise helps reduce stress, increase energy, and improves fitness. Increasing exercise without diet control, however, may not burn enough calories to loose weight.       Start walking three days a week 10-20 minutes at a time    Work towards walking thirty minutes five days a week     Eventually, increase the speed of your walking for 1-2 minutes at time    In addition, we recommend that you review healthy lifestyles and methods for weight loss available through the National Institutes of Health patient information sites:  http://win.niddk.nih.gov/publications/index.htm    And look into health and wellness programs that may be available through your health insurance provider, employer, local community center, or lance club.    Weight management plan: Patient was referred to their PCP to discuss a diet and exercise plan.        Your Body mass index is 29.03 kg/(m^2).  Weight management is a personal decision.  If you are interested in exploring weight loss strategies, the following discussion covers the approaches that may be successful. Body mass index (BMI) is one way to tell whether you are at a healthy weight, overweight, or obese. It measures your weight in relation to your height.  A BMI of 18.5 to 24.9 is in the healthy range. A person with a BMI of 25 to 29.9 is considered overweight, and someone with a BMI of 30 or greater is considered obese. More than two-thirds of American adults are considered overweight or obese.  Being overweight or obese increases the risk for further weight gain. Excess weight may lead to heart disease and diabetes.  Creating and following plans for healthy eating and physical activity  may help you improve your health.  Weight control is part of healthy lifestyle and includes exercise, emotional health, and healthy eating habits. Careful eating habits lifelong are the mainstay of weight control. Though there are significant health benefits from weight loss, long-term weight loss with diet alone may be very difficult to achieve- studies show long-term success with dietary management in less than 10% of people. Attaining a healthy weight may be especially difficult to achieve in those with severe obesity. In some cases, medications, devices and surgical management might be considered.  What can you do?  If you are overweight or obese and are interested in methods for weight loss, you should discuss this with your provider.     Consider reducing daily calorie intake by 500 calories.     Keep a food journal.     Avoiding skipping meals, consider cutting portions instead.    Diet combined with exercise helps maintain muscle while optimizing fat loss. Strength training is particularly important for building and maintaining muscle mass. Exercise helps reduce stress, increase energy, and improves fitness. Increasing exercise without diet control, however, may not burn enough calories to loose weight.       Start walking three days a week 10-20 minutes at a time    Work towards walking thirty minutes five days a week     Eventually, increase the speed of your walking for 1-2 minutes at time    In addition, we recommend that you review healthy lifestyles and methods for weight loss available through the National Institutes of Health patient information sites:  http://win.niddk.nih.gov/publications/index.htm    And look into health and wellness programs that may be available through your health insurance provider, employer, local community center, or lance club.    Weight management plan: Patient was referred to their PCP to discuss a diet and exercise plan.          Follow-ups after your visit       "  Follow-up notes from your care team     Return in 1 year (on 2/28/2019) for PAP follow up.      Your next 10 appointments already scheduled     Mar 01, 2018 11:30 AM CST   Oximetry Drop Off with SLEEP LAB, BED FIVE   [facility]    [address]  [facility] [address]   [phone]              Future tests that were ordered for you today     Open Future Orders        Priority Expected Expires Ordered    Overnight oximetry study Routine  8/27/2018 2/28/2018            Who to contact     If you have questions or need follow up information about today's clinic visit or your schedule please contact [facility] directly at [phone].  Normal or non-critical lab and imaging results will be communicated to you by MyChart, letter or phone within 4 business days after the clinic has received the results. If you do not hear from us within 7 days, please contact the clinic through [employer] or phone. If you have a critical or abnormal lab result, we will notify you by phone as soon as possible.  Submit refill requests through "[employer] or call your pharmacy and they will forward the refill request to us. Please allow 3 business days for your refill to be completed.          Additional Information About Your Visit        [employer] Information     "[employer] gives you secure access to your electronic health record. If you see a primary care provider, you can also send messages to your care team and make appointments. If you have questions, please call your primary care clinic.  If you do not have a primary care provider, please call [phone] and they will assist you.        Care EveryWhere ID     This is your Care EveryWhere ID. This could be used by other organizations to access your [place] medical records  [id number]        Your Vitals Were     Pulse Height Pulse Oximetry BMI (Body Mass Index)          67 1.854 m (6' 1\") 99% 29.03 kg/m2      "    Blood Pressure from Last 3 Encounters:   02/28/18 118/79   01/02/18 118/79   12/19/17 125/79    Weight from Last 3 Encounters:   02/28/18 99.8 kg (220 lb)   01/02/18 98 kg (216 lb)   12/19/17 98 kg (216 lb)              We Performed the Following     Sleep Comprehensive DME        Primary Care Provider Office Phone # Fax #    R Kwabena Galvan [phone] [address] [facility] [address]        Equal Access to Services     ELFEGO FUNEZ : Hadii aad ku hadasho Soomaali, waaxda luqadaha, qaybta kaalmada adeegyada, waxmaria esther thompson haybettie landin . So [facility] [phone].    ATENCIÓN: Si habla español, tiene a gonzales disposición servicios gratuitos de asistencia lingüística. Llame al [phone].    We comply with applicable federal civil rights laws and [place] laws. We do not discriminate on the basis of race, color, national origin, age, disability, sex, sexual orientation, or gender identity.            Thank you!     Thank you for choosing [facility]  for your care. Our goal is always to provide you with excellent care. Hearing back from our patients is one way we can continue to improve our services. Please take a few minutes to complete the written survey that you may receive in the mail after your visit with us. Thank you!             Your Updated Medication List - Protect others around you: Learn how to safely use, store and throw away your medicines at www.disposemymeds.org.          This list is accurate as of 2/28/18  3:27 PM.  Always use your most recent med list.                   Brand Name Dispense Instructions for use Diagnosis    NO ACTIVE MEDICATIONS      .

## 2018-03-01 ENCOUNTER — DOCUMENTATION ONLY (OUTPATIENT)
Dept: SLEEP MEDICINE | Facility: CLINIC | Age: 48
End: 2018-03-01
Payer: COMMERCIAL

## 2018-03-01 DIAGNOSIS — G47.33 OSA (OBSTRUCTIVE SLEEP APNEA): ICD-10-CM

## 2018-03-01 NOTE — LETTER
[facility]  [address]  [facility] [address]  Phone: [phone]      March 2, 2018    Chris Jones  [address] [facility] [address]      Dear Mr. Jones     Thank you for allowing me to participate in your care. I am writing to inform you of your overnight oximeter results.      Oximetry results were obtained for the date of 2/28/2018.  The patient was on a treatment of CPAP 9-18 cm/H20.  The study showed a valid recording time of 6 hours and 39 minutes with a 4% desaturation index of 1.2.  The basal oxygen saturation was 94.7% and the lowest SpO2 was ~90%.  The patient spent 0 minutes below 88% SpO2.     A/P  No significant hypoxemia on CPAP.   Continue current treatment and follow up as planned.     Eitan Anaya PA-C            Thank you for choosing [facility]. As a result, please continue with the treatment plan discussed in the office. Return as discussed or sooner if symptoms worsens or fail to improve. If you have any further questions or concerns, please do not hesitate to contact us.      Sincerely,    Alyssa TRAYLOR

## 2018-03-02 NOTE — PROGRESS NOTES
Oximetry results were obtained for the date of 2/28/2018.  The patient was on a treatment of CPAP 9-18 cm/H20.  The study showed a valid recording time of 6 hours and 39 minutes with a 4% desaturation index of 1.2.  The basal oxygen saturation was 94.7% and the lowest SpO2 was ~90%.  The patient spent 0 minutes below 88% SpO2.    A/P  No significant hypoxemia on CPAP.   Continue current treatment and follow up as planned.    Eitan Anaya PA-C

## 2018-07-17 ENCOUNTER — DOCUMENTATION ONLY (OUTPATIENT)
Dept: SLEEP MEDICINE | Facility: CLINIC | Age: 48
End: 2018-07-17
Payer: COMMERCIAL

## 2018-07-17 NOTE — PROGRESS NOTES
6 month [facility] Recheck Visit     Diagnostic AHI:  52.2     Data only recheck     Assessment: Pt meeting objective benchmarks.     Action plan:   Pt to follow up per provider request (1-2 yrs).       Device type: Auto-CPAP  PAP settings: CPAP min 9.0 cm  H20     CPAP max 18.0 cm  H20    CPAP fixed 0 cm  H20    95th% pressure 14.6 cm  H20   Objective measures: 14 day rolling measures      Compliance  42 %      Leak  12.4 lpm  last  upload      AHI 1.07   last  upload      Average number of minutes 205      Objective measure goal  Compliance   Goal >70%  Leak   Goal < 24 lpm  AHI  Goal < 5  Usage  Goal >240

## 2019-06-04 ENCOUNTER — HOSPITAL ENCOUNTER (EMERGENCY)
Facility: CLINIC | Age: 49
Discharge: HOME | End: 2019-06-04
Attending: EMERGENCY MEDICINE | Admitting: EMERGENCY MEDICINE
Payer: COMMERCIAL

## 2019-06-04 ENCOUNTER — APPOINTMENT (OUTPATIENT)
Dept: CT IMAGING | Facility: CLINIC | Age: 49
End: 2019-06-04
Attending: EMERGENCY MEDICINE
Payer: COMMERCIAL

## 2019-06-04 VITALS
OXYGEN SATURATION: 96 % | HEART RATE: 68 BPM | RESPIRATION RATE: 16 BRPM | DIASTOLIC BLOOD PRESSURE: 87 MMHG | BODY MASS INDEX: 29.03 KG/M2 | WEIGHT: 220 LBS | TEMPERATURE: 97.7 F | SYSTOLIC BLOOD PRESSURE: 128 MMHG

## 2019-06-04 DIAGNOSIS — R55 VASOVAGAL SYNCOPE: ICD-10-CM

## 2019-06-04 DIAGNOSIS — R51.9 ACUTE NONINTRACTABLE HEADACHE, UNSPECIFIED HEADACHE TYPE: ICD-10-CM

## 2019-06-04 LAB
ALBUMIN SERPL-MCNC: 4.1 G/DL (ref 3.4–5)
ALP SERPL-CCNC: 56 U/L (ref 40–150)
ALT SERPL W P-5'-P-CCNC: 41 U/L (ref 0–70)
ANION GAP SERPL CALCULATED.3IONS-SCNC: 5 MMOL/L (ref 3–14)
AST SERPL W P-5'-P-CCNC: 16 U/L (ref 0–45)
BASOPHILS # BLD AUTO: 0.1 10E9/L (ref 0–0.2)
BASOPHILS NFR BLD AUTO: 0.7 %
BILIRUB SERPL-MCNC: 0.9 MG/DL (ref 0.2–1.3)
BUN SERPL-MCNC: 15 MG/DL (ref 7–30)
CALCIUM SERPL-MCNC: 9.2 MG/DL (ref 8.5–10.1)
CHLORIDE SERPL-SCNC: 109 MMOL/L (ref 94–109)
CO2 SERPL-SCNC: 28 MMOL/L (ref 20–32)
CREAT SERPL-MCNC: 0.92 MG/DL (ref 0.66–1.25)
DIFFERENTIAL METHOD BLD: NORMAL
EOSINOPHIL # BLD AUTO: 0.3 10E9/L (ref 0–0.7)
EOSINOPHIL NFR BLD AUTO: 2.7 %
ERYTHROCYTE [DISTWIDTH] IN BLOOD BY AUTOMATED COUNT: 11.7 % (ref 10–15)
GFR SERPL CREATININE-BSD FRML MDRD: >90 ML/MIN/{1.73_M2}
GLUCOSE SERPL-MCNC: 95 MG/DL (ref 70–99)
HCT VFR BLD AUTO: 46.1 % (ref 40–53)
HGB BLD-MCNC: 15.7 G/DL (ref 13.3–17.7)
IMM GRANULOCYTES # BLD: 0 10E9/L (ref 0–0.4)
IMM GRANULOCYTES NFR BLD: 0.4 %
LYMPHOCYTES # BLD AUTO: 1.6 10E9/L (ref 0.8–5.3)
LYMPHOCYTES NFR BLD AUTO: 14.8 %
MCH RBC QN AUTO: 30.1 PG (ref 26.5–33)
MCHC RBC AUTO-ENTMCNC: 34.1 G/DL (ref 31.5–36.5)
MCV RBC AUTO: 89 FL (ref 78–100)
MONOCYTES # BLD AUTO: 0.9 10E9/L (ref 0–1.3)
MONOCYTES NFR BLD AUTO: 8.4 %
NEUTROPHILS # BLD AUTO: 7.8 10E9/L (ref 1.6–8.3)
NEUTROPHILS NFR BLD AUTO: 73 %
NRBC # BLD AUTO: 0 10*3/UL
NRBC BLD AUTO-RTO: 0 /100
PLATELET # BLD AUTO: 261 10E9/L (ref 150–450)
POTASSIUM SERPL-SCNC: 4 MMOL/L (ref 3.4–5.3)
PROT SERPL-MCNC: 7.5 G/DL (ref 6.8–8.8)
RBC # BLD AUTO: 5.21 10E12/L (ref 4.4–5.9)
SODIUM SERPL-SCNC: 142 MMOL/L (ref 133–144)
TROPONIN I SERPL-MCNC: <0.015 UG/L (ref 0–0.04)
WBC # BLD AUTO: 10.6 10E9/L (ref 4–11)

## 2019-06-04 PROCEDURE — 93010 ELECTROCARDIOGRAM REPORT: CPT | Mod: Z6 | Performed by: EMERGENCY MEDICINE

## 2019-06-04 PROCEDURE — 96360 HYDRATION IV INFUSION INIT: CPT | Performed by: EMERGENCY MEDICINE

## 2019-06-04 PROCEDURE — 80053 COMPREHEN METABOLIC PANEL: CPT | Performed by: EMERGENCY MEDICINE

## 2019-06-04 PROCEDURE — 70450 CT HEAD/BRAIN W/O DYE: CPT

## 2019-06-04 PROCEDURE — 99285 EMERGENCY DEPT VISIT HI MDM: CPT | Mod: 25 | Performed by: EMERGENCY MEDICINE

## 2019-06-04 PROCEDURE — 93005 ELECTROCARDIOGRAM TRACING: CPT | Performed by: EMERGENCY MEDICINE

## 2019-06-04 PROCEDURE — 85025 COMPLETE CBC W/AUTO DIFF WBC: CPT | Performed by: EMERGENCY MEDICINE

## 2019-06-04 PROCEDURE — 25800030 ZZH RX IP 258 OP 636: Performed by: EMERGENCY MEDICINE

## 2019-06-04 PROCEDURE — 84484 ASSAY OF TROPONIN QUANT: CPT | Performed by: EMERGENCY MEDICINE

## 2019-06-04 RX ADMIN — SODIUM CHLORIDE, POTASSIUM CHLORIDE, SODIUM LACTATE AND CALCIUM CHLORIDE 1000 ML: 600; 310; 30; 20 INJECTION, SOLUTION INTRAVENOUS at 15:47

## 2019-06-04 NOTE — ED NOTES
Pt reports he passed out at work, reports he was sitting upright in a chair and woke up on the floor. Pt reports he has had episodes like this in the past and has had follow up with PCP and had to wear a monitor for a period of time. Pt unsure if he hit his head and reports he has had a headache for the last couple days

## 2019-06-04 NOTE — ED PROVIDER NOTES
History     Chief Complaint   Patient presents with     Loss of Consciousness     syncopal episode while sitting, has happened before, headache for a few days     HPI  Chris Jones is a 49 year old male who presents for evaluation after a loss of consciousness.  History obtained from the patient his wife.  Just prior to arrival the patient was at work, works as a  at a local school, sitting at his desk when he suddenly felt lightheaded, nauseous, developed tunnel vision, and then fell to the floor.  He next remembers waking up on the ground.  His coworker side and says that he was unconscious for less than a minute and then woke up and was able to quickly transferred to a chair and was back to his baseline.  He says he feels well now except for a lingering headache.  He denies chest pain, difficulty breathing, cough, abdominal pain, vomiting, bloody stools, or numbness or tingling.  He reports that this has happened several times before, review of his chart shows he had a ZIO Patch 12/2017 that showed multiple PACs and no other cause of these episodes.    The patient says that over the past 3 days he has had headache every day.  The pattern has been the same but he wakes up feeling well and then sometime before noon developed a headache.  He developed this headache about 5 hours prior to his arrival here, headache is located in the right temporal region, mild to moderate in severity currently.  He took acetaminophen for with no real improvement.  He says this is very abnormal for him he did hardly ever gets headaches.  He thought maybe he was dehydrated and stated to drink more water yesterday.  No change in his diet.  No change in his caffeine intake.  He has been sleeping well and using his BiPAP for sleep apnea without interruptions.  His wife has not noted any apneic episodes recently.  He denies fever, double vision, sore throat, runny nose, ear pain, or rash.    Allergies:  Allergies    Allergen Reactions     Nkda [No Known Drug Allergies]        Problem List:    Patient Active Problem List    Diagnosis Date Noted     SEYMOUR (obstructive sleep apnea) 01/02/2018     Priority: Medium     Home Sleep Apnea Testing - 12/26/2017: 216 lbs 0 oz: AHI 52.2/hr. Supine AHI 81.5/hr. Oxygen [place] of 77%.  Baseline 92.6%.  Sp02 =< 88% for 29.4 minutes.       CARDIOVASCULAR SCREENING; LDL GOAL LESS THAN 160 10/31/2010     Priority: Medium        Past Medical History:    No past medical history on file.    Past Surgical History:    Past Surgical History:   Procedure Laterality Date     SEPTOPLASTY  1992     VASECTOMY         Family History:    Family History   Problem Relation Age of Onset     Breast Cancer Mother      Cancer Paternal Grandfather      Sleep Apnea Sister        Social History:  Marital Status:   [2]  Social History     Tobacco Use     Smoking status: Never Smoker     Smokeless tobacco: Never Used   Substance Use Topics     Alcohol use: No     Drug use: No        Medications:      Acetaminophen (TYLENOL PO)         Review of Systems  Pertinent positives and negatives listed in the HPI, all other systems reviewed and are negative.    Physical Exam   BP: (!) 136/92  Pulse: 70  Heart Rate: 72  Temp: 97.7  F (36.5  C)  Resp: 22  Weight: 99.8 kg (220 lb)  SpO2: 96 %       Physical Exam   Constitutional: He is oriented to person, place, and time. He appears well-developed and well-nourished. He appears distressed.   HENT:   Head: Normocephalic and atraumatic.   Right Ear: External ear normal.   Left Ear: External ear normal.   Nose: Nose normal.   Eyes: Conjunctivae are normal. No scleral icterus.   Neck: Normal range of motion.   Cardiovascular: Normal rate and regular rhythm.   Pulmonary/Chest: Effort normal. No stridor. No respiratory distress. He has no wheezes.   Abdominal: Soft. He exhibits no distension.   Neurological: He is alert and oriented to person, place, and time. He displays no  tremor. No cranial nerve deficit. He exhibits normal muscle tone. Coordination and gait normal. GCS eye subscore is 4. GCS verbal subscore is 5. GCS motor subscore is 6.   No dysmetria.  No dysdiadochokinesis.   Skin: Skin is warm and dry. He is not diaphoretic.   Psychiatric: He has a normal mood and affect. His behavior is normal.   Nursing note and vitals reviewed.      ED Course        Procedures               EKG Interpretation:      Interpreted by Gaston Alvarenga  Time reviewed: 1505  Symptoms at time of EKG: None   Rhythm: normal sinus   Rate: normal  Axis: left  Ectopy: none  Conduction: normal  ST Segments/ T Waves: No ST-T wave changes  Q Waves: none  Comparison to prior: No old EKG available    Clinical Impression: normal EKG      Critical Care time:  none               Results for orders placed or performed during the hospital encounter of 06/04/19 (from the past 24 hour(s))   CBC with platelets, differential   Result Value Ref Range    WBC 10.6 4.0 - 11.0 10e9/L    RBC Count 5.21 4.4 - 5.9 10e12/L    Hemoglobin 15.7 13.3 - 17.7 g/dL    Hematocrit 46.1 40.0 - 53.0 %    MCV 89 78 - 100 fl    MCH 30.1 26.5 - 33.0 pg    MCHC 34.1 31.5 - 36.5 g/dL    RDW 11.7 10.0 - 15.0 %    Platelet Count 261 150 - 450 10e9/L    Diff Method Automated Method     % Neutrophils 73.0 %    % Lymphocytes 14.8 %    % Monocytes 8.4 %    % Eosinophils 2.7 %    % Basophils 0.7 %    % Immature Granulocytes 0.4 %    Nucleated RBCs 0 0 /100    Absolute Neutrophil 7.8 1.6 - 8.3 10e9/L    Absolute Lymphocytes 1.6 0.8 - 5.3 10e9/L    Absolute Monocytes 0.9 0.0 - 1.3 10e9/L    Absolute Eosinophils 0.3 0.0 - 0.7 10e9/L    Absolute Basophils 0.1 0.0 - 0.2 10e9/L    Abs Immature Granulocytes 0.0 0 - 0.4 10e9/L    Absolute Nucleated RBC 0.0    Comprehensive metabolic panel   Result Value Ref Range    Sodium 142 133 - 144 mmol/L    Potassium 4.0 3.4 - 5.3 mmol/L    Chloride 109 94 - 109 mmol/L    Carbon Dioxide 28 20 - 32 mmol/L    Anion  Gap 5 3 - 14 mmol/L    Glucose 95 70 - 99 mg/dL    Urea Nitrogen 15 7 - 30 mg/dL    Creatinine 0.92 0.66 - 1.25 mg/dL    GFR Estimate >90 >60 mL/min/[1.73_m2]    GFR Estimate If Black >90 >60 mL/min/[1.73_m2]    Calcium 9.2 8.5 - 10.1 mg/dL    Bilirubin Total 0.9 0.2 - 1.3 mg/dL    Albumin 4.1 3.4 - 5.0 g/dL    Protein Total 7.5 6.8 - 8.8 g/dL    Alkaline Phosphatase 56 40 - 150 U/L    ALT 41 0 - 70 U/L    AST 16 0 - 45 U/L   Troponin I   Result Value Ref Range    Troponin I ES <0.015 0.000 - 0.045 ug/L   CT Head w/o Contrast    Narrative    CT SCAN OF THE HEAD WITHOUT CONTRAST   6/4/2019 4:05 PM     HISTORY: Headache, syncope.    TECHNIQUE:  Axial images of the head and coronal reformations without  IV contrast material. Radiation dose for this scan was reduced using  automated exposure control, adjustment of the mA and/or kV according  to patient size, or iterative reconstruction technique.    COMPARISON: None.    FINDINGS: There is no evidence of intracranial hemorrhage, mass, acute  infarct or anomaly. The ventricles are normal in size, shape and  configuration. The brain parenchyma and subarachnoid spaces are  normal.     The visualized portions of the sinuses and mastoids appear normal. The  bony calvarium and bones of the skull base appear intact.       Impression    IMPRESSION:  No evidence of acute intracranial hemorrhage, mass, or  herniation.      DAVID DONNELLY MD       Medications   lactated ringers BOLUS 1,000 mL (1,000 mLs Intravenous New Bag 6/4/19 1298)       Assessments & Plan (with Medical Decision Making)   49-year-old male who presents for loss of consciousness.  Heart rate 69, temperature is 97.7  F, SPO2 is 96% on room air, blood pressure 147/93.  EKG sinus rhythm with out signs of ischemia or dysrhythmia such as WPW, Brugada syndrome, arrhythmogenic right ventricular dysplasia, prolonged QT syndrome.  Electrolytes are within normal limits.  Hemoglobin is normal at 15.7.  The patient's symptoms  do not sound like subarachnoid hemorrhage given the slow onset of the headache in the coming and going nature over the last 3 days.  He is given IV fluids.  Given the strange episode and these headaches, CT of the head is obtained, images reviewed independently as well as radiology read reviewed, no signs of intracranial mass, hemorrhage, or other acute change.  On recheck the patient is tolerating oral intake, ambulating without difficulty, and is safe to discharge with reassurance and instructions to return if he has worsening symptoms or concerns, otherwise follow-up in clinic for recheck in 1 to 2 weeks.  The patient is in agreement with this plan.    I have reviewed the nursing notes.    I have reviewed the findings, diagnosis, plan and need for follow up with the patient.          Medication List      There are no discharge medications for this visit.         Final diagnoses:   Vasovagal syncope   Acute nonintractable headache, unspecified headache type       6/4/2019   [facility] EMERGENCY DEPARTMENT     Gaston Alvarenga MD  06/04/19 7673

## 2019-06-04 NOTE — ED AVS SNAPSHOT
[facility] Emergency Department  [address] [facility] [address]  Phone:  [phone]  Fax:  [phone]                                    Chris Jones   MRN: [id number]    Department:  [facility] Emergency Department   Date of Visit:  6/4/2019           After Visit Summary Signature Page    I have received my discharge instructions, and my questions have been answered. I have discussed any challenges I see with this plan with the nurse or doctor.    ..........................................................................................................................................  Patient/Patient Representative Signature      ..........................................................................................................................................  Patient Representative Print Name and Relationship to Patient    ..................................................               ................................................  Date                                   Time    ..........................................................................................................................................  Reviewed by Signature/Title    ...................................................              ..............................................  Date                                               Time          22EPIC Rev 08/18

## 2019-06-04 NOTE — DISCHARGE INSTRUCTIONS
Return to the emergency department with worsening headache, repeated episodes, fever, chest pain, or other concerns.  Otherwise follow-up in clinic in 1 to 2 weeks for a recheck.

## 2019-09-16 DIAGNOSIS — G47.33 OBSTRUCTIVE SLEEP APNEA (ADULT) (PEDIATRIC): Primary | ICD-10-CM

## 2020-02-10 ENCOUNTER — HEALTH MAINTENANCE LETTER (OUTPATIENT)
Age: 50
End: 2020-02-10

## 2020-06-10 ENCOUNTER — DOCUMENTATION ONLY (OUTPATIENT)
Dept: SLEEP MEDICINE | Facility: CLINIC | Age: 50
End: 2020-06-10

## 2020-06-10 NOTE — PROGRESS NOTES
6/10/2020-  - RETURNED CALL AND GAVE CENTRAL SCHEDULING # [phone]. HE WILL CALL [facility] FOR PRESSURE CHANGE RX. I EXPLAINED AFTER [facility] RECEIVES RX, PRESSURE CAN BE CHANGED REMOTELY

## 2020-06-30 ENCOUNTER — VIRTUAL VISIT (OUTPATIENT)
Dept: SLEEP MEDICINE | Facility: CLINIC | Age: 50
End: 2020-06-30
Payer: COMMERCIAL

## 2020-06-30 VITALS — HEIGHT: 71 IN | BODY MASS INDEX: 32.2 KG/M2 | WEIGHT: 230 LBS

## 2020-06-30 DIAGNOSIS — G47.33 OSA (OBSTRUCTIVE SLEEP APNEA): Primary | ICD-10-CM

## 2020-06-30 PROCEDURE — 99213 OFFICE O/P EST LOW 20 MIN: CPT | Mod: GT | Performed by: PHYSICIAN ASSISTANT

## 2020-06-30 ASSESSMENT — MIFFLIN-ST. JEOR: SCORE: 1935.4

## 2020-06-30 NOTE — PROGRESS NOTES
"Chris Jones is a 50 year old male who is being evaluated via a billable video visit.      The patient has been notified of following:     \"This video visit will be conducted via a call between you and your physician/provider. We have found that certain health care needs can be provided without the need for an in-person physical exam.  This service lets us provide the care you need with a video conversation.  If a prescription is necessary we can send it directly to your pharmacy.  If lab work is needed we can place an order for that and you can then stop by our lab to have the test done at a later time.    Video visits are billed at different rates depending on your insurance coverage.  Please reach out to your insurance provider with any questions.    If during the course of the call the physician/provider feels a video visit is not appropriate, you will not be charged for this service.\"    Patient has given verbal consent for Video visit? Yes  How would you like to obtain your AVS? MyChart      Video-Visit Details    Type of service:  Video Visit    Video Start Time: 10:28 AM  Video End Time: 10:43 AM    Originating Location (pt. Location): Home    Distant Location (provider location):  [facility]     Platform used for Video Visit: [facility]    Obstructive Sleep Apnea - PAP Follow-Up Visit:    Chief Complaint   Patient presents with     CPAP Follow Up       Chris Jones comes in today for follow-up of their severe sleep apnea, managed with CPAP.     Chris Jones initially presented with loud snoring, witnessed apnea, non-refreshing sleep, daytime sleepiness(ESS 12) and crowded oropharynx..      Home Sleep Apnea Testing - 12/26/2017: 216 lbs 0 oz: AHI 52.2/hr. Supine AHI 81.5/hr.   Oxygen [place] of 77%. Baseline 92.6%. Sp02 =< 88% for 29.4 minutes.    January 15, 2018. Patient received a Resmed AirSense 10 Auto. Pressures were set at 5-15 cm H2O.    Oximetry results were obtained for the date of " "2/28/2018.  The patient was on a treatment of CPAP 9-18 cm/H20.  The study showed a valid recording time of 6 hours and 39 minutes with a 4% desaturation index of 1.2.  The basal oxygen saturation was 94.7% and the lowest SpO2 was ~90%.  The patient spent 0 minutes below 88% SpO2    Overall, the patient rates his experience with PAP as 6 (0 poor, 10 great). The mask is comfortable. The mask is not leaking.  He is not snoring with the mask on. He is not having gasp arousals. He is not having any oral or nasal dryness. The pressure settings are comfortable    His PAP interface is Full Face Mask.    Bedtime is typically 11 PM. Usually it takes about 10 minutes to fall asleep with the mask on. Wake time is typically 8:30 AM.  Patient is using PAP therapy--- hours per night. The patient is usually getting 8 hours of sleep per night.    He does feel rested in the morning.    Total score - [place]: 7 (6/30/2020  9:00 AM)      APOLINAR Total Score: 5      ResMed   Auto-PAP 9.0 - 18.0 cmH2O 30 day usage data:    50% of days with > 4 hours of use. 6/30 days with no use.   Average use 258 minutes per day.   95%ile Leak 28.39 L/min.   CPAP 95% pressure 15.9 cm.   AHI 2.77 events per hour.         Past medical/surgical history, family history, social history, medications and allergies were reviewed.      Problem List:  Patient Active Problem List    Diagnosis Date Noted     SE[place] (obstructive sleep apnea) 01/02/2018     Priority: Medium     Home Sleep Apnea Testing - 12/26/2017: 216 lbs 0 oz: AHI 52.2/hr. Supine AHI 81.5/hr. Oxygen [place]arte of 77%.  Baseline 92.6%.  Sp02 =< 88% for 29.4 minutes.       CARDIOVASCULAR SCREENING; LDL GOAL LESS THAN 160 10/31/2010     Priority: Medium        Ht 1.803 m (5' 11\")   Wt 104.3 kg (230 lb)   BMI 32.08 kg/m      Impression/Plan:  Severe Sleep apnea.   Tolerating PAP well. Daytime symptoms are improved.   Patient counseled to increase CPAP use.  He reports occasional difficulty taking full breaths " with CPAP, will reach out to [facility].   Comprehensive DME      Chris Jones will follow up in about 1 year or sooner if any concerns.    Eitan Anaya PA-C

## 2020-06-30 NOTE — PATIENT INSTRUCTIONS
Your BMI is Body mass index is 32.08 kg/m .  Weight management is a personal decision.  If you are interested in exploring weight loss strategies, the following discussion covers the approaches that may be successful. Body mass index (BMI) is one way to tell whether you are at a healthy weight, overweight, or obese. It measures your weight in relation to your height.  A BMI of 18.5 to 24.9 is in the healthy range. A person with a BMI of 25 to 29.9 is considered overweight, and someone with a BMI of 30 or greater is considered obese. More than two-thirds of American adults are considered overweight or obese.  Being overweight or obese increases the risk for further weight gain. Excess weight may lead to heart disease and diabetes.  Creating and following plans for healthy eating and physical activity may help you improve your health.  Weight control is part of healthy lifestyle and includes exercise, emotional health, and healthy eating habits. Careful eating habits lifelong are the mainstay of weight control. Though there are significant health benefits from weight loss, long-term weight loss with diet alone may be very difficult to achieve- studies show long-term success with dietary management in less than 10% of people. Attaining a healthy weight may be especially difficult to achieve in those with severe obesity. In some cases, medications, devices and surgical management might be considered.  What can you do?  If you are overweight or obese and are interested in methods for weight loss, you should discuss this with your provider.     Consider reducing daily calorie intake by 500 calories.     Keep a food journal.     Avoiding skipping meals, consider cutting portions instead.    Diet combined with exercise helps maintain muscle while optimizing fat loss. Strength training is particularly important for building and maintaining muscle mass. Exercise helps reduce stress, increase energy, and improves fitness.  Increasing exercise without diet control, however, may not burn enough calories to loose weight.       Start walking three days a week 10-20 minutes at a time    Work towards walking thirty minutes five days a week     Eventually, increase the speed of your walking for 1-2 minutes at time    In addition, we recommend that you review healthy lifestyles and methods for weight loss available through the National Institutes of Health patient information sites:  http://win.niddk.nih.gov/publications/index.htm    And look into health and wellness programs that may be available through your health insurance provider, employer, local community center, or lance club.    Weight management plan: Patient was referred to their PCP to discuss a diet and exercise plan.

## 2020-07-01 ENCOUNTER — DOCUMENTATION ONLY (OUTPATIENT)
Dept: SLEEP MEDICINE | Facility: CLINIC | Age: 50
End: 2020-07-01
Payer: COMMERCIAL

## 2020-07-01 DIAGNOSIS — G47.33 OSA (OBSTRUCTIVE SLEEP APNEA): Primary | ICD-10-CM

## 2020-07-01 NOTE — PROGRESS NOTES
STM Recheck: Patient feeling surges in pressure and is unable to take deep breaths at certain times. Will put in and order to to reduce pressure.

## 2020-11-16 ENCOUNTER — HEALTH MAINTENANCE LETTER (OUTPATIENT)
Age: 50
End: 2020-11-16

## 2021-02-10 ENCOUNTER — VIRTUAL VISIT (OUTPATIENT)
Dept: FAMILY MEDICINE | Facility: CLINIC | Age: 51
End: 2021-02-10
Payer: COMMERCIAL

## 2021-02-10 DIAGNOSIS — Z20.822 SUSPECTED 2019 NOVEL CORONAVIRUS INFECTION: Primary | ICD-10-CM

## 2021-02-10 DIAGNOSIS — Z20.822 SUSPECTED COVID-19 VIRUS INFECTION: ICD-10-CM

## 2021-02-10 PROCEDURE — 99213 OFFICE O/P EST LOW 20 MIN: CPT | Mod: GT | Performed by: FAMILY MEDICINE

## 2021-02-10 NOTE — PROGRESS NOTES
"Chris is a 51 year old who is being evaluated via a billable video visit.      How would you like to obtain your AVS? MyChart  If the video visit is dropped, the invitation should be resent by: Send to e-mail at: [email or web address]  Will anyone else be joining your video visit? No      Video Start Time: 1540    Assessment & Plan     Suspected 2019 novel coronavirus infection  URI symptoms, conjunctivitis, loss of smell, sinus congestion  , back in school x 2 weeks  Needs COVID testing, discussed quarantine x 10 days no matter what the outcome of the test is.   - Symptomatic COVID-19 Virus (Coronavirus) by PCR; Future             BMI:   Estimated body mass index is 32.08 kg/m  as calculated from the following:    Height as of 6/30/20: 1.803 m (5' 11\").    Weight as of 6/30/20: 104.3 kg (230 lb).           No follow-ups on file.    Shannon De Anda MD  [facility]   Chris is a 51 year old who presents for the following health issues     HPI       Acute Illness  Acute illness concerns:   Onset/Duration: Saturday 4 days ago  Symptoms:  Fever: no  Chills/Sweats: YES- chills and sweats  Headache (location?): no  Sinus Pressure: no  Conjunctivitis:  YES- itching and burning   Ear Pain: no  Rhinorrhea: no  Congestion: YES  Sore Throat: no  Cough: no  Wheeze: no  Decreased Appetite: YES  Nausea: YES  Vomiting: no  Diarrhea: no  Dysuria/Freq.: no  Dysuria or Hematuria: no  Fatigue/Achiness: YES- fatigue and body aches  Sick/Strep Exposure: no  Therapies tried and outcome: ibuprofen and acetaminophen    Concern for COVID-19  About how many days ago did these symptoms start? 4 days ago  Is this your first visit for this illness? Yes  In the 14 days before your symptoms started, have you had close contact with someone with COVID-19 (Coronavirus)? No  Do you have a fever or chills? Yes, I felt feverish or had chills : Chills  Are you having new or worsening difficulty " breathing? No  Do you have new or worsening cough? No  Have you had any new or unexplained body aches? YES    Have you experienced any of the following NEW symptoms?    Headache: No    Sore throat: No    Loss of taste or smell: No    Chest pain: No    Diarrhea: No    Rash: No  What treatments have you tried? Ibuprofen, acetaminophen   Who do you live with? Spouse and 2 sons  Are you, or a household member, a healthcare worker or a ? No  Do you live in a nursing home, group home, or shelter? No  Do you have a way to get food/medications if quarantined? Yes, I have a friend or family member who can help me.              Review of Systems         Objective           Vitals:  No vitals were obtained today due to virtual visit.    Physical Exam   GENERAL: Healthy, alert and no distress  EYES: Eyes grossly normal to inspection.  No discharge or erythema, or obvious scleral/conjunctival abnormalities.  RESP: No audible wheeze, cough, or visible cyanosis.  No visible retractions or increased work of breathing.    SKIN: Visible skin clear. No significant rash, abnormal pigmentation or lesions.  NEURO: Cranial nerves grossly intact.  Mentation and speech appropriate for age.  PSYCH: Mentation appears normal, affect normal/bright, judgement and insight intact, normal speech and appearance well-groomed.                Video-Visit Details    Type of service:  Video Visit    Video End Time:5770    Originating Location (pt. Location): Home    Distant Location (provider location):  [facility]     Platform used for Video Visit: [employer]

## 2021-02-11 DIAGNOSIS — Z20.822 SUSPECTED 2019 NOVEL CORONAVIRUS INFECTION: ICD-10-CM

## 2021-02-11 PROCEDURE — U0005 INFEC AGEN DETEC AMPLI PROBE: HCPCS | Performed by: FAMILY MEDICINE

## 2021-02-11 PROCEDURE — U0003 INFECTIOUS AGENT DETECTION BY NUCLEIC ACID (DNA OR RNA); SEVERE ACUTE RESPIRATORY SYNDROME CORONAVIRUS 2 (SARS-COV-2) (CORONAVIRUS DISEASE [COVID-19]), AMPLIFIED PROBE TECHNIQUE, MAKING USE OF HIGH THROUGHPUT TECHNOLOGIES AS DESCRIBED BY CMS-2020-01-R: HCPCS | Performed by: FAMILY MEDICINE

## 2021-02-12 LAB
LABORATORY COMMENT REPORT: ABNORMAL
SARS-COV-2 RNA RESP QL NAA+PROBE: NORMAL
SARS-COV-2 RNA RESP QL NAA+PROBE: POSITIVE
SPECIMEN SOURCE: ABNORMAL
SPECIMEN SOURCE: NORMAL

## 2021-02-16 ENCOUNTER — TELEPHONE (OUTPATIENT)
Dept: FAMILY MEDICINE | Facility: CLINIC | Age: 51
End: 2021-02-16

## 2021-02-16 DIAGNOSIS — R11.0 NAUSEA: Primary | ICD-10-CM

## 2021-02-16 RX ORDER — ONDANSETRON 4 MG/1
4 TABLET, ORALLY DISINTEGRATING ORAL EVERY 8 HOURS PRN
Status: CANCELLED | OUTPATIENT
Start: 2021-02-16

## 2021-02-16 NOTE — TELEPHONE ENCOUNTER
Reason for call:  Patient reporting a symptom    Symptom or request: Pt tested positive for Covid 19 2/11 and has had bad nausea the last three days, wondering if there is anything to do for this, he has had trouble keeping fluids down     Duration (how long have symptoms been present): few days     Have you been treated for this before? No    Additional comments:     Phone Number patient can be reached at:  Cell number on file:    Telephone Information:   Mobile [phone]       Best Time:  any    Can we leave a detailed message on this number:  YES    Call taken on 2/16/2021 at 3:11 PM by Helen Jack

## 2021-02-16 NOTE — TELEPHONE ENCOUNTER
Patient had a virtual visit on 2.10.21 and tested positive for COVID on 2.11.21  He has been very nauseated for the past 3 days. He has not been able to keep anything down until today. Patient has had the dry heaves.   Today he was able to keep sips of water and tea down. He also had a few bites of toast and rice. Patient is very nauseated after he tries to eat or drink. He is wondering if he is able to get something to help with the nausea so he can start drinking more fluids. Script pended. Routed to provider.  Alesia Warren RN

## 2021-02-17 RX ORDER — ONDANSETRON 4 MG/1
4 TABLET, FILM COATED ORAL EVERY 6 HOURS PRN
Qty: 12 TABLET | Refills: 0 | Status: SHIPPED | OUTPATIENT
Start: 2021-02-17 | End: 2022-12-05

## 2021-02-17 NOTE — CONFIDENTIAL NOTE
Left detailed message for patient with permission with the message below.  Call back with questions.    Annamaria Crowder RN

## 2021-02-17 NOTE — TELEPHONE ENCOUNTER
Prescription for zofran sent to pharmacy.    Can take 1 tablet every 6 hours as needed.  If still unable to keep fluids down, needs to be evaluated in ER for dehydration, need for IV fluids, etc.    Shannon De Anda M.D.

## 2021-04-03 ENCOUNTER — HEALTH MAINTENANCE LETTER (OUTPATIENT)
Age: 51
End: 2021-04-03

## 2021-09-12 ENCOUNTER — HEALTH MAINTENANCE LETTER (OUTPATIENT)
Age: 51
End: 2021-09-12

## 2022-04-24 ENCOUNTER — HEALTH MAINTENANCE LETTER (OUTPATIENT)
Age: 52
End: 2022-04-24

## 2022-11-19 ENCOUNTER — HEALTH MAINTENANCE LETTER (OUTPATIENT)
Age: 52
End: 2022-11-19

## 2022-12-04 ASSESSMENT — ENCOUNTER SYMPTOMS
HEARTBURN: 0
CONSTIPATION: 0
HEADACHES: 0
NERVOUS/ANXIOUS: 0
HEMATOCHEZIA: 0
DIZZINESS: 0
PARESTHESIAS: 0
WEAKNESS: 0
HEMATURIA: 0
FREQUENCY: 0
DYSURIA: 0
CHILLS: 0
ARTHRALGIAS: 0
DIARRHEA: 0
FEVER: 0
COUGH: 0
SHORTNESS OF BREATH: 0
PALPITATIONS: 0
ABDOMINAL PAIN: 0
EYE PAIN: 0
JOINT SWELLING: 0
NAUSEA: 0
SORE THROAT: 0
MYALGIAS: 0

## 2022-12-05 ENCOUNTER — OFFICE VISIT (OUTPATIENT)
Dept: FAMILY MEDICINE | Facility: CLINIC | Age: 52
End: 2022-12-05
Payer: COMMERCIAL

## 2022-12-05 VITALS
HEIGHT: 74 IN | TEMPERATURE: 97.5 F | HEART RATE: 74 BPM | DIASTOLIC BLOOD PRESSURE: 90 MMHG | OXYGEN SATURATION: 96 % | SYSTOLIC BLOOD PRESSURE: 136 MMHG | RESPIRATION RATE: 16 BRPM | WEIGHT: 245 LBS | BODY MASS INDEX: 31.44 KG/M2

## 2022-12-05 DIAGNOSIS — Z13.1 SCREENING FOR DIABETES MELLITUS: ICD-10-CM

## 2022-12-05 DIAGNOSIS — Z13.220 SCREENING FOR HYPERLIPIDEMIA: ICD-10-CM

## 2022-12-05 DIAGNOSIS — Z00.00 ENCOUNTER FOR SCREENING AND PREVENTATIVE CARE: Primary | ICD-10-CM

## 2022-12-05 DIAGNOSIS — Z12.11 SCREEN FOR COLON CANCER: ICD-10-CM

## 2022-12-05 LAB
ANION GAP SERPL CALCULATED.3IONS-SCNC: 6 MMOL/L (ref 7–15)
BUN SERPL-MCNC: 16.9 MG/DL (ref 6–20)
CALCIUM SERPL-MCNC: 8.8 MG/DL (ref 8.6–10)
CHLORIDE SERPL-SCNC: 108 MMOL/L (ref 98–107)
CHOLEST SERPL-MCNC: 161 MG/DL
CREAT SERPL-MCNC: 1.07 MG/DL (ref 0.67–1.17)
DEPRECATED HCO3 PLAS-SCNC: 28 MMOL/L (ref 22–29)
GFR SERPL CREATININE-BSD FRML MDRD: 85 ML/MIN/1.73M2
GLUCOSE SERPL-MCNC: 111 MG/DL (ref 70–99)
HDLC SERPL-MCNC: 38 MG/DL
LDLC SERPL CALC-MCNC: 71 MG/DL
NONHDLC SERPL-MCNC: 123 MG/DL
POTASSIUM SERPL-SCNC: 4.3 MMOL/L (ref 3.4–5.3)
SODIUM SERPL-SCNC: 142 MMOL/L (ref 136–145)
TRIGL SERPL-MCNC: 261 MG/DL

## 2022-12-05 PROCEDURE — 80061 LIPID PANEL: CPT | Performed by: FAMILY MEDICINE

## 2022-12-05 PROCEDURE — 99396 PREV VISIT EST AGE 40-64: CPT | Mod: 25 | Performed by: FAMILY MEDICINE

## 2022-12-05 PROCEDURE — 90471 IMMUNIZATION ADMIN: CPT | Performed by: FAMILY MEDICINE

## 2022-12-05 PROCEDURE — 36415 COLL VENOUS BLD VENIPUNCTURE: CPT | Performed by: FAMILY MEDICINE

## 2022-12-05 PROCEDURE — 80048 BASIC METABOLIC PNL TOTAL CA: CPT | Performed by: FAMILY MEDICINE

## 2022-12-05 PROCEDURE — 90715 TDAP VACCINE 7 YRS/> IM: CPT | Performed by: FAMILY MEDICINE

## 2022-12-05 ASSESSMENT — ENCOUNTER SYMPTOMS
EYE PAIN: 0
HEMATURIA: 0
DIARRHEA: 0
ABDOMINAL PAIN: 0
FREQUENCY: 0
FEVER: 0
WEAKNESS: 0
CHILLS: 0
MYALGIAS: 0
SHORTNESS OF BREATH: 0
SORE THROAT: 0
PARESTHESIAS: 0
NAUSEA: 0
ARTHRALGIAS: 0
COUGH: 0
HEARTBURN: 0
CONSTIPATION: 0
PALPITATIONS: 0
NERVOUS/ANXIOUS: 0
HEMATOCHEZIA: 0
DYSURIA: 0
HEADACHES: 0
JOINT SWELLING: 0
DIZZINESS: 0

## 2022-12-05 ASSESSMENT — PAIN SCALES - GENERAL: PAINLEVEL: NO PAIN (0)

## 2022-12-05 NOTE — NURSING NOTE
Screening Questionnaire for Adult Immunization   Are you sick today? No  Do you have allergies to medications, food, a vaccine component or latex? No  Have you ever had a serious reaction after receiving a vaccination? No  Do you have a long-term health problem with heart, lung, kidney, metabolic disease (e.g. diabetes)disease, asthma,a blood disorder, no spleen, complement component deficiency, a cochlear implant, or a spinal fluid leak?  Are you on long-term aspirin therapy? No  Do you, or does a close family member, have cancer, leukemia, HIV/AIDS, or any other immune system problem? No  In the past 3 months, have you taken medications that affect your immune system, such as cortisone, prednisone, other steroids, or anticancer drugs; drugs for the teatment of rheumatoid arthritis, Crohn's disease, pr psoriasis;  or have you had radiation treatments? No  Have you had a seizure, or a brain or other nervous system problem? No  During the past year, have you received a transfusion of blood or blood products, or been given immune (gamma) globulin or antiviral drug? No  For women: Are you pregnant or is there a chance you could become pregnant during the next month? No  Have you received any vaccinations in the past 4 weeks? No    Immunization questionnaire answers were all negative.        Per orders of Dr. Tollvier, injection of Tdap given by Annamaria De La Rosa MA. Patient instructed to remain in clinic for 20 minutes afterwards, and to report any adverse reaction to me immediately.       Screening performed by Annamaria De La Rosa MA on 12/5/2022 at 8:21 AM.

## 2022-12-05 NOTE — PROGRESS NOTES
SUBJECTIVE:   CC: Chris is an 52 year old who presents for preventative health visit.   Patient has been advised of split billing requirements and indicates understanding: Yes  Healthy Habits:     Getting at least 3 servings of Calcium per day:  Yes    Bi-annual eye exam:  Yes    Dental care twice a year:  Yes    Sleep apnea or symptoms of sleep apnea:  Sleep apnea    Diet:  Regular (no restrictions)    Frequency of exercise:  2-3 days/week    Taking medications regularly:  Not Applicable    Medication side effects:  Not applicable    PHQ-2 Total Score: 0    Additional concerns today:  No    Patient would like a colonoscopy referrral    Today's PHQ-2 Score:   PHQ-2 ( 1999 Pfizer) 12/4/2022   Q1: Little interest or pleasure in doing things 0   Q2: Feeling down, depressed or hopeless 0   PHQ-2 Score 0   PHQ-2 Total Score (12-17 Years)- Positive if 3 or more points; Administer PHQ-A if positive -   Q1: Little interest or pleasure in doing things Not at all   Q2: Feeling down, depressed or hopeless Not at all   PHQ-2 Score 0     Have you ever done Advance Care Planning? (For example, a Health Directive, POLST, or a discussion with a medical provider or your loved ones about your wishes): No, advance care planning information given to patient to review.  Patient declined advance care planning discussion at this time.    Social History     Tobacco Use     Smoking status: Never     Smokeless tobacco: Never   Substance Use Topics     Alcohol use: Yes     If you drink alcohol do you typically have >3 drinks per day or >7 drinks per week? No    Alcohol Use 12/5/2022   Prescreen: >3 drinks/day or >7 drinks/week? -   Prescreen: >3 drinks/day or >7 drinks/week? No       Last PSA: No results found for: PSA    Reviewed orders with patient. Reviewed health maintenance and updated orders accordingly - Yes    Reviewed and updated as needed this visit by clinical staff   Tobacco  Allergies  Meds  Problems  Med Hx  Surg Hx  Fam  "Hx        Reviewed and updated as needed this visit by Provider   Tobacco  Allergies  Meds  Problems  Med Hx  Surg Hx  Fam Hx           Family History   Problem Relation Age of Onset     Breast Cancer Mother      Substance Abuse Father         non-diagnosed     Cancer Paternal Grandfather      Sleep Apnea Sister    Prostate cancer dad's dad    Review of Systems   Constitutional: Negative for chills and fever.   HENT: Negative for congestion, ear pain, hearing loss and sore throat.    Eyes: Negative for pain and visual disturbance.   Respiratory: Negative for cough and shortness of breath.    Cardiovascular: Negative for chest pain, palpitations and peripheral edema.   Gastrointestinal: Negative for abdominal pain, constipation, diarrhea, heartburn, hematochezia and nausea.   Genitourinary: Negative for dysuria, frequency, genital sores, hematuria, impotence, penile discharge and urgency.   Musculoskeletal: Negative for arthralgias, joint swelling and myalgias.   Skin: Negative for rash.   Neurological: Negative for dizziness, weakness, headaches and paresthesias.   Psychiatric/Behavioral: Negative for mood changes. The patient is not nervous/anxious.        OBJECTIVE:   BP (!) 136/90   Pulse 74   Temp 97.5  F (36.4  C) (Tympanic)   Resp 16   Ht 1.88 m (6' 2\")   Wt 111.1 kg (245 lb)   SpO2 96%   BMI 31.46 kg/m      Physical Exam  GENERAL: healthy, alert and no distress  EYES: Eyes grossly normal to inspection, PERRL and conjunctivae and sclerae normal  HENT: ear canals and TM's normal, nose and mouth without ulcers or lesions  NECK: no adenopathy, no asymmetry, masses, or scars and thyroid normal to palpation  RESP: lungs clear to auscultation - no rales, rhonchi or wheezes  CV: regular rate and rhythm, normal S1 S2, no S3 or S4, no murmur, click or rub, no peripheral edema and peripheral pulses strong  ABDOMEN: soft, nontender, no hepatosplenomegaly, no masses and bowel sounds normal  MS: no gross " musculoskeletal defects noted, no edema  SKIN: no suspicious lesions or rashes  NEURO: Normal strength and tone, mentation intact and speech normal  PSYCH: mentation appears normal, affect normal/bright    Diagnostic Test Results:  Labs reviewed in Epic    ASSESSMENT/PLAN:   Chris was seen today for physical and health maintenance.    Diagnoses and all orders for this visit:    Encounter for screening and preventative care  -     REVIEW OF HEALTH MAINTENANCE PROTOCOL ORDERS    Screen for colon cancer  -     Colonoscopy Screening  Referral; Future    Screening for hyperlipidemia  -     Lipid panel reflex to direct LDL Non-fasting; Future  -     Lipid panel reflex to direct LDL Non-fasting    Screening for diabetes mellitus  -     Basic metabolic panel  (Ca, Cl, CO2, Creat, Gluc, K, Na, BUN); Future  -     Basic metabolic panel  (Ca, Cl, CO2, Creat, Gluc, K, Na, BUN)    Other orders  -     TDAP VACCINE (Adacel, Boostrix)  [[id number]]      Patient has been advised of split billing requirements and indicates understanding: Yes    COUNSELING:   Reviewed preventive health counseling, as reflected in patient instructions    He reports that he has never smoked. He has never used smokeless tobacco.      Juanita Tolliver MD  [facility]

## 2022-12-13 DIAGNOSIS — R73.01 IMPAIRED FASTING GLUCOSE: Primary | ICD-10-CM

## 2023-04-03 ENCOUNTER — TRANSFERRED RECORDS (OUTPATIENT)
Dept: MULTI SPECIALTY CLINIC | Facility: CLINIC | Age: 53
End: 2023-04-03

## 2024-01-28 ENCOUNTER — HEALTH MAINTENANCE LETTER (OUTPATIENT)
Age: 54
End: 2024-01-28

## 2025-02-01 ENCOUNTER — HEALTH MAINTENANCE LETTER (OUTPATIENT)
Age: 55
End: 2025-02-01

## 2025-04-28 ENCOUNTER — MYC MEDICAL ADVICE (OUTPATIENT)
Dept: FAMILY MEDICINE | Facility: CLINIC | Age: 55
End: 2025-04-28

## 2025-04-28 ENCOUNTER — OFFICE VISIT (OUTPATIENT)
Dept: FAMILY MEDICINE | Facility: CLINIC | Age: 55
End: 2025-04-28
Payer: COMMERCIAL

## 2025-04-28 ENCOUNTER — TELEPHONE (OUTPATIENT)
Dept: FAMILY MEDICINE | Facility: CLINIC | Age: 55
End: 2025-04-28

## 2025-04-28 VITALS
SYSTOLIC BLOOD PRESSURE: 130 MMHG | DIASTOLIC BLOOD PRESSURE: 86 MMHG | OXYGEN SATURATION: 99 % | TEMPERATURE: 98.3 F | HEART RATE: 76 BPM | RESPIRATION RATE: 18 BRPM | BODY MASS INDEX: 31.7 KG/M2 | HEIGHT: 74 IN | WEIGHT: 247 LBS

## 2025-04-28 DIAGNOSIS — H60.332 ACUTE SWIMMER'S EAR OF LEFT SIDE: Primary | ICD-10-CM

## 2025-04-28 PROCEDURE — 3079F DIAST BP 80-89 MM HG: CPT | Performed by: FAMILY MEDICINE

## 2025-04-28 PROCEDURE — 3075F SYST BP GE 130 - 139MM HG: CPT | Performed by: FAMILY MEDICINE

## 2025-04-28 PROCEDURE — 1125F AMNT PAIN NOTED PAIN PRSNT: CPT | Performed by: FAMILY MEDICINE

## 2025-04-28 PROCEDURE — 99213 OFFICE O/P EST LOW 20 MIN: CPT | Performed by: FAMILY MEDICINE

## 2025-04-28 ASSESSMENT — PAIN SCALES - GENERAL: PAINLEVEL_OUTOF10: MILD PAIN (2)

## 2025-04-28 NOTE — TELEPHONE ENCOUNTER
Pharmacy     New Medication Request    Contacts       Contact Date/Time Type Contact Phone/Fax    04/28/2025 04:02 PM CDT Phone (Incoming) [facility] - [place] - [address] [facility] (Pharmacy) [phone]            What medication are you requesting?: Alternative medication for ciprofloxacin-hydrocortisone (CIPRO HC OTIC) 0.2-1 %     Reason for medication request: Co-pay for this medication is about  353.00. Patient cannot afford this.    Controlled Substance Agreement on file:   CSA -- Patient Level:    CSA: None found at the patient level.         Patient offered an appointment? Yes: about was today for swimmers ear    Preferred Pharmacy:   [facility] - [place] - [id number] [facility] [address]  Phone: [phone] Fax: [phone]    Helen Joya/ Patient

## 2025-04-28 NOTE — PROGRESS NOTES
"  Assessment & Plan     Acute swimmer's ear of left side  Patient was nominated for ice bucket [place]  Ear canal swollen, flaky  Was not able to visualize TM due to swelling  Start abx drops  If no improvement after 3-5 days, follow up for repeat exam  Use cotton balls to prevent getting water in the ear during showers  - ciprofloxacin-hydrocortisone (CIPRO HC OTIC) 0.2-1 % otic suspension; Place 3 drops Into the left ear 2 times daily for 14 days.          BMI  Estimated body mass index is 31.71 kg/m  as calculated from the following:    Height as of this encounter: 1.88 m (6' 2\").    Weight as of this encounter: 112 kg (247 lb).             Ciro Perez is a 55 year old, presenting for the following health issues:  Ear Problem        4/28/2025     3:04 PM   Additional Questions   Roomed by Diandra JURADO   Accompanied by self     Via the Health Maintenance questionnaire, the patient has reported the following services have been completed -Colonscopy: Unknown clinic [place], [place] 2022-05-18, this information has been sent to the abstraction team.  History of Present Illness       Reason for visit:  Ear Blockage/Pain  Symptom onset:  3-7 days ago  Symptoms include:  Blocked ear canal  Symptom intensity:  Mild  Symptom progression:  Staying the same  Had these symptoms before:  No  What makes it worse:  No  What makes it better:  Flushing with warm water helps, but the canal still feels blocked.   He is taking medications regularly.                        Objective    /86   Pulse 76   Temp 98.3  F (36.8  C) (Tympanic)   Resp 18   Ht 1.88 m (6' 2\")   Wt 112 kg (247 lb)   SpO2 99%   BMI 31.71 kg/m    Body mass index is 31.71 kg/m .  Physical Exam  Vitals reviewed.   HENT:      Right Ear: Swelling present. No mastoid tenderness.      Left Ear: Tympanic membrane normal.      Ears:      Comments: Swelling of ear canal left  Cardiovascular:      Rate and Rhythm: Normal rate.   Pulmonary:      " Effort: Pulmonary effort is normal.   Neurological:      Mental Status: He is alert.                    Signed Electronically by: Karla Reynolds MD

## 2025-04-28 NOTE — NURSING NOTE
"Chief Complaint   Patient presents with    Ear Problem       Initial /86   Pulse 76   Temp 98.3  F (36.8  C) (Tympanic)   Resp 18   Ht 1.88 m (6' 2\")   Wt 112 kg (247 lb)   SpO2 99%   BMI 31.71 kg/m   Estimated body mass index is 31.71 kg/m  as calculated from the following:    Height as of this encounter: 1.88 m (6' 2\").    Weight as of this encounter: 112 kg (247 lb).    Patient presents to the clinic using No DME    Is there anyone who you would like to be able to receive your results? No  If yes have patient fill out MAURA      "

## 2025-05-12 RX ORDER — CIPROFLOXACIN AND DEXAMETHASONE 3; 1 MG/ML; MG/ML
4 SUSPENSION/ DROPS AURICULAR (OTIC) 2 TIMES DAILY
Qty: 7.5 ML | Refills: 0 | Status: SHIPPED | OUTPATIENT
Start: 2025-05-12 | End: 2025-05-22

## 2025-07-21 ENCOUNTER — TELEPHONE (OUTPATIENT)
Dept: SURGERY | Facility: CLINIC | Age: 55
End: 2025-07-21
Payer: COMMERCIAL

## 2025-07-21 ENCOUNTER — PATIENT OUTREACH (OUTPATIENT)
Dept: CARE COORDINATION | Facility: CLINIC | Age: 55
End: 2025-07-21
Payer: COMMERCIAL

## 2025-07-21 NOTE — TELEPHONE ENCOUNTER
Reason for Call:  Other call back    Detailed comments: Patient left a voice message, he has additional questions regarding hernia surgery. How long he will need to take off from work. Please call to advise Thank you     No surgery orders found, patient was seen today     Phone Number Patient can be reached at: Cell number on file:    Telephone Information:   Mobile [phone]       Best Time: any    Can we leave a detailed message on this number? YES    Call taken on 7/21/2025 at 1:11 PM by Jenna Cooper

## 2025-07-28 ENCOUNTER — OFFICE VISIT (OUTPATIENT)
Dept: SURGERY | Facility: CLINIC | Age: 55
End: 2025-07-28
Attending: NURSE PRACTITIONER
Payer: COMMERCIAL

## 2025-07-28 VITALS
BODY MASS INDEX: 32.1 KG/M2 | DIASTOLIC BLOOD PRESSURE: 88 MMHG | HEART RATE: 55 BPM | WEIGHT: 250 LBS | SYSTOLIC BLOOD PRESSURE: 144 MMHG | TEMPERATURE: 97.4 F

## 2025-07-28 DIAGNOSIS — K40.90 NON-RECURRENT UNILATERAL INGUINAL HERNIA WITHOUT OBSTRUCTION OR GANGRENE: ICD-10-CM

## 2025-07-28 DIAGNOSIS — K40.90 LEFT INGUINAL HERNIA: Primary | ICD-10-CM

## 2025-07-28 PROCEDURE — 3077F SYST BP >= 140 MM HG: CPT | Performed by: STUDENT IN AN ORGANIZED HEALTH CARE EDUCATION/TRAINING PROGRAM

## 2025-07-28 PROCEDURE — 1125F AMNT PAIN NOTED PAIN PRSNT: CPT | Performed by: STUDENT IN AN ORGANIZED HEALTH CARE EDUCATION/TRAINING PROGRAM

## 2025-07-28 PROCEDURE — 99243 OFF/OP CNSLTJ NEW/EST LOW 30: CPT | Performed by: STUDENT IN AN ORGANIZED HEALTH CARE EDUCATION/TRAINING PROGRAM

## 2025-07-28 PROCEDURE — 3079F DIAST BP 80-89 MM HG: CPT | Performed by: STUDENT IN AN ORGANIZED HEALTH CARE EDUCATION/TRAINING PROGRAM

## 2025-07-28 ASSESSMENT — PAIN SCALES - GENERAL: PAINLEVEL_OUTOF10: MILD PAIN (2)

## 2025-07-28 NOTE — LETTER
"7/28/2025      Chris Jones  [address] [place] [id number]      Dear Colleague,    Thank you for referring your patient, Chris Jones, to the [facility]. Please see a copy of my visit note below.    Surgical Consultation/History and Physical  [facility] Surgery    Chris is seen in consultation for left inguinal hernia, at the request of REAL Padilla    Chief Complaint:  left groin discomfort    History of Present Illness: Chris Jones is a 55 year old male who presents to surgery clinic for evaluation of a left inguinal hernia. First noticed symptoms about 2 months ago. Describes symptoms of left groin \"burning\" and reducible swelling, which have been gradually worsening. Denies any other bulges or symptoms at right groin. No associated nausea or emesis, has been tolerating a regular diet. Denies any episodes of bloating, obstipation, or inability to pass gas. No overlying skin changes. Denies any tobacco use. 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    Past Surgical History:   Procedure Laterality Date     SEPTOPLASTY  1992     VASECTOMY         Family History   Problem Relation Age of Onset     Breast Cancer Mother      Substance Abuse Father         non-diagnosed     Sleep Apnea Sister      Cancer Paternal Grandfather        Social History     Tobacco Use     Smoking status: Never     Smokeless tobacco: Never   Substance Use Topics     Alcohol use: Yes     Comment: occas        History   Drug Use No       No current outpatient medications on file.       Allergies   Allergen Reactions     Nkda [No Known Drug Allergy]        Review of Systems:   10 point ROS negative other than what was listed in HPI    Physical Exam:  BP (!) 144/88 (BP Location: Right arm, Patient " Position: Chair, Cuff Size: Adult Large)   Pulse 55   Temp 97.4  F (36.3  C) (Tympanic)   Wt 113.4 kg (250 lb)   BMI 32.10 kg/m      Constitutional- No acute distress, well nourished, non-toxic  Eyes: Anicteric, no injection.  PERRL  ENT:  Normocephalic, atraumatic, Nose midline, moist mucus membranes  Neck - supple, no LAD  Respiratory- Nonlabored breathing on room air  Cardiovascular - Vital signs stable, extremities warm and well perfused  Abdomen - Soft, non-tender, no hepatosplenomegaly, no palpable masses  Groins - 2+ pulses bilaterally and no LAD, reducible left inguinal hernia more prominent with valsalva. Possible small asymptomatic right inguinal hernia as well  Neuro - No focal neuro deficits, Alert and oriented x 3  Psych: Appropriate mood and affect  Musculoskeletal: Normal gait, symmetric strength.  FROM upper and lower extremities.  Skin: Warm, Dry    Assessment:  1. Chris Jones presents with an symptomatic left inguinal hernia. Symptoms are progressively worsening recently. The patient may benefit from hernia repair, and the indications, risks, benefits and alternatives to surgery were discussed in detail. Based on their history and clinical exam, I recommend proceeding with a robotic-assisted inguinal hernia repair. The patient understood the counseling offered and wishes to proceed as planned and outlined. Risks specifically discussed include bleeding, infection, seroma, need for additional treatment, nontherapeutic intervention, recurrent hernia, potential need for mesh, potential need for temporary surgical drain, wound complication (such as dehiscence), and rare complications related to surgery and/or anesthesia such as venous thromboembolism and cardiorespiratory complications. We did also discuss the potential for discovering an asymptomatic inguinal hernia on the contralateral side. We mutually agreed on not repairing an asymptomatic hernia to reduce risk of causing surgical  complications, such as mesh infection or iatrogenic injury, on an asymptomatic side.     In general, additionally we recommend avoiding long-term opioid medication for non-cancer pain due to well described risks. Today's visit included an extensive discussion on the risks of chronic opioid therapy, including tolerance, dependence, addiction, hyperalgesia, respiratory depression and death.     Plan:   1. Plan for elective robotic-assisted left inguinal hernia repair  2. Will need pre-op H&P with PCP prior to procedure  3. Anticipate 2 weeks of no strenuous activity (no lifting more than 20 lbs) for 2 weeks post-operatively        Shree Goodrich MD  7/28/2025 at 8:24 AM      Again, thank you for allowing me to participate in the care of your patient.        Sincerely,        Shree Goodrich MD    Electronically signed

## 2025-07-28 NOTE — PROGRESS NOTES
"Surgical Consultation/History and Physical  [facility] General Surgery    Chris is seen in consultation for left inguinal hernia, at the request of REAL Padilla    Chief Complaint:  left groin discomfort    History of Present Illness: Chris Jones is a 55 year old male who presents to surgery clinic for evaluation of a left inguinal hernia. First noticed symptoms about 2 months ago. Describes symptoms of left groin \"burning\" and reducible swelling, which have been gradually worsening. Denies any other bulges or symptoms at right groin. No associated nausea or emesis, has been tolerating a regular diet. Denies any episodes of bloating, obstipation, or inability to pass gas. No overlying skin changes. Denies any tobacco use. 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    Past Surgical History:   Procedure Laterality Date    SEPTOPLASTY  1992    VASECTOMY         Family History   Problem Relation Age of Onset    Breast Cancer Mother     Substance Abuse Father         non-diagnosed    Sleep Apnea Sister     Cancer Paternal Grandfather        Social History     Tobacco Use    Smoking status: Never    Smokeless tobacco: Never   Substance Use Topics    Alcohol use: Yes     Comment: occas        History   Drug Use No       No current outpatient medications on file.       Allergies   Allergen Reactions    Nkda [No Known Drug Allergy]        Review of Systems:   10 point ROS negative other than what was listed in HPI    Physical Exam:  BP (!) 144/88 (BP Location: Right arm, Patient Position: Chair, Cuff Size: Adult Large)   Pulse 55   Temp 97.4  F (36.3  C) (Tympanic)   Wt 113.4 kg (250 lb)   BMI 32.10 kg/m      Constitutional- No acute distress, well nourished, non-toxic  Eyes: Anicteric, no injection.  PERRL  ENT:  " Normocephalic, atraumatic, Nose midline, moist mucus membranes  Neck - supple, no LAD  Respiratory- Nonlabored breathing on room air  Cardiovascular - Vital signs stable, extremities warm and well perfused  Abdomen - Soft, non-tender, no hepatosplenomegaly, no palpable masses  Groins - 2+ pulses bilaterally and no LAD, reducible left inguinal hernia more prominent with valsalva. Possible small asymptomatic right inguinal hernia as well  Neuro - No focal neuro deficits, Alert and oriented x 3  Psych: Appropriate mood and affect  Musculoskeletal: Normal gait, symmetric strength.  FROM upper and lower extremities.  Skin: Warm, Dry    Assessment:  1. Chris Jones presents with an symptomatic left inguinal hernia. Symptoms are progressively worsening recently. The patient may benefit from hernia repair, and the indications, risks, benefits and alternatives to surgery were discussed in detail. Based on their history and clinical exam, I recommend proceeding with a robotic-assisted inguinal hernia repair. The patient understood the counseling offered and wishes to proceed as planned and outlined. Risks specifically discussed include bleeding, infection, seroma, need for additional treatment, nontherapeutic intervention, recurrent hernia, potential need for mesh, potential need for temporary surgical drain, wound complication (such as dehiscence), and rare complications related to surgery and/or anesthesia such as venous thromboembolism and cardiorespiratory complications. We did also discuss the potential for discovering an asymptomatic inguinal hernia on the contralateral side. We mutually agreed on not repairing an asymptomatic hernia to reduce risk of causing surgical complications, such as mesh infection or iatrogenic injury, on an asymptomatic side.     In general, additionally we recommend avoiding long-term opioid medication for non-cancer pain due to well described risks. Today's visit included an extensive  discussion on the risks of chronic opioid therapy, including tolerance, dependence, addiction, hyperalgesia, respiratory depression and death.     Plan:   1. Plan for elective robotic-assisted left inguinal hernia repair  2. Will need pre-op H&P with PCP prior to procedure  3. Anticipate 2 weeks of no strenuous activity (no lifting more than 20 lbs) for 2 weeks post-operatively        Shree Goodrich MD  7/28/2025 at 8:24 AM

## 2025-07-28 NOTE — NURSING NOTE
"Initial BP (!) 144/88 (BP Location: Right arm, Patient Position: Chair, Cuff Size: Adult Large)   Pulse 55   Temp 97.4  F (36.3  C) (Tympanic)   Wt 113.4 kg (250 lb)   BMI 32.10 kg/m   Estimated body mass index is 32.1 kg/m  as calculated from the following:    Height as of 7/18/25: 1.88 m (6' 2\").    Weight as of this encounter: 113.4 kg (250 lb). .  Sarah Ruiz LPN    "

## 2025-07-28 NOTE — PATIENT INSTRUCTIONS
1. Plan for elective robotic-assisted left inguinal hernia repair  2. Will need pre-op H&P with PCP prior to procedure  3. Anticipate 2 weeks of no strenuous activity (no lifting more than 20 lbs) for 2 weeks post-operatively   Detail Level: Detailed Quality 226: Preventive Care And Screening: Tobacco Use: Screening And Cessation Intervention: Patient screened for tobacco use and is an ex/non-smoker